# Patient Record
Sex: MALE | Race: WHITE | NOT HISPANIC OR LATINO | Employment: FULL TIME | URBAN - METROPOLITAN AREA
[De-identification: names, ages, dates, MRNs, and addresses within clinical notes are randomized per-mention and may not be internally consistent; named-entity substitution may affect disease eponyms.]

---

## 2017-06-02 ENCOUNTER — OFFICE VISIT (OUTPATIENT)
Dept: MEDICAL GROUP | Facility: PHYSICIAN GROUP | Age: 37
End: 2017-06-02
Payer: COMMERCIAL

## 2017-06-02 VITALS
OXYGEN SATURATION: 96 % | TEMPERATURE: 97.2 F | BODY MASS INDEX: 37.19 KG/M2 | SYSTOLIC BLOOD PRESSURE: 128 MMHG | RESPIRATION RATE: 18 BRPM | HEIGHT: 77 IN | DIASTOLIC BLOOD PRESSURE: 62 MMHG | HEART RATE: 86 BPM | WEIGHT: 315 LBS

## 2017-06-02 DIAGNOSIS — Z72.0 CURRENT TOBACCO USE: ICD-10-CM

## 2017-06-02 DIAGNOSIS — Z13.6 SCREENING FOR CARDIOVASCULAR, RESPIRATORY, AND GENITOURINARY DISEASES: ICD-10-CM

## 2017-06-02 DIAGNOSIS — Z11.3 SCREENING FOR STDS (SEXUALLY TRANSMITTED DISEASES): ICD-10-CM

## 2017-06-02 DIAGNOSIS — Z13.89 SCREENING FOR CARDIOVASCULAR, RESPIRATORY, AND GENITOURINARY DISEASES: ICD-10-CM

## 2017-06-02 DIAGNOSIS — Z13.83 SCREENING FOR CARDIOVASCULAR, RESPIRATORY, AND GENITOURINARY DISEASES: ICD-10-CM

## 2017-06-02 DIAGNOSIS — F19.10 SUBSTANCE ABUSE (HCC): ICD-10-CM

## 2017-06-02 DIAGNOSIS — E66.01 MORBID OBESITY WITH BMI OF 40.0-44.9, ADULT (HCC): ICD-10-CM

## 2017-06-02 PROCEDURE — 99204 OFFICE O/P NEW MOD 45 MIN: CPT | Performed by: NURSE PRACTITIONER

## 2017-06-02 RX ORDER — TRAZODONE HYDROCHLORIDE 150 MG/1
150 TABLET ORAL NIGHTLY
COMMUNITY
End: 2017-06-02

## 2017-06-02 ASSESSMENT — PATIENT HEALTH QUESTIONNAIRE - PHQ9: CLINICAL INTERPRETATION OF PHQ2 SCORE: 0

## 2017-06-02 NOTE — PROGRESS NOTES
Chief Complaint   Patient presents with   • Establish Care   • Other     weight gain    • Nicotine Dependence   • Orders Needed     STD labs        HPI:    Phil Rossi is a 37 y.o. male here to establish care and to discuss the following:  Patient has not had a previous primary care    Substance abuse  Patient admits to being a recovering alcohol and drug addict with his last intake on October 18, 2017. He's been using all kinds of drugs to include cocaine, meth and heroin.  He also admits to using injectable testosterone for muscle growth and his last dose was 1 mL May 25, 2017. Patient is requesting STD testing due to lifestyle.    Morbid obesity with BMI of 40.0-44.9, adult (HCC)  Patient is morbidly obese with a BMI of 41.52. Currently 3 and 50 pounds and states that he continues to use something about his weight. He states that he is a recovering addict and has been using drugs and alcohol until October 18, 2016. He would like a referral to discuss weight management options.    Current tobacco use  Patient reports trying to quit smoking and has decreased to 8 cigarettes daily along with vaping        Current medicines (including changes today)  No current outpatient prescriptions on file.     No current facility-administered medications for this visit.     He  has a past medical history of PTSD (post-traumatic stress disorder).  He  has no past surgical history on file.  Social History   Substance Use Topics   • Smoking status: Current Some Day Smoker -- 0.50 packs/day   • Smokeless tobacco: Current User      Comment: 8 cigarettes and vaping   • Alcohol Use: No      Comment: alcoholism October 18, 2014 recovering addict     Social History     Social History Narrative     History reviewed. No pertinent family history.  No family status information on file.         ROS  Review of Systems   Constitutional: Negative for fever, chills, weight loss and malaise/fatigue.   HENT: Negative for ear pain, nosebleeds,  "congestion, sore throat and neck pain.    Eyes: Negative for blurred vision.   Respiratory: Negative for cough, sputum production, shortness of breath and wheezing.    Cardiovascular: Negative for chest pain, palpitations,  and leg swelling.   Gastrointestinal: Negative for heartburn, nausea, vomiting, diarrhea and abdominal pain.   Genitourinary: Negative for dysuria, urgency and frequency.   Musculoskeletal: Negative for myalgias, back pain and joint pain.   Skin: Negative for rash and itching  Neurological: Negative for dizziness, tingling, tremors, sensory change, focal weakness and headaches.   Endo/Heme/Allergies: Does not bruise/bleed easily.   Psychiatric/Behavioral: Negative for depression, anxiety, suicidal ideas, insomnia and memory loss.      All other systems reviewed and are negative except as in HPI.     Objective:     Blood pressure 128/62, pulse 86, temperature 36.2 °C (97.2 °F), resp. rate 18, height 1.956 m (6' 5.01\"), weight 158.85 kg (350 lb 3.2 oz), SpO2 96 %. Body mass index is 41.52 kg/(m^2).  Physical Exam:  Constitutional: Alert, no distress.  Skin: Warm, dry, good turgor, no rashes in visible areas. No abscesses noted   Eye: Equal, round and reactive, conjunctiva clear, lids normal.  ENMT: Lips without lesions, good dentition, oropharynx clear. Ear canals are clear, TMs within normal limits bilaterally.   Neck: Trachea midline, no masses, no thyromegaly. No cervical or supraclavicular lymphadenopathy.  Respiratory: Unlabored respiratory effort, lungs clear to auscultation, no wheezes, no ronchi.  Cardiovascular: Normal S1, S2, no murmur, no edema.  Abdomen: Obese, Soft, non-tender, no masses, no hepatosplenomegaly.  Psych: Alert and oriented x3, normal affect and mood.  MS: Ambulates independently with steady gait. Has full range of motion of all extremities and spine.  Neuro:  Cranial nerves intact. DTRs within normal limits. No neurological deficits.        Assessment and Plan:   The " following treatment plan was discussed   1. Morbid obesity with BMI of 40.0-44.9, adult ([facility])  REFERRAL TO BARIATRIC SURGERY    TSH WITH REFLEX TO FT4    Patient identified as having weight management issue.  Appropriate orders and counseling given.    Referrals to bariatrics   2. Substance abuse  VITAMIN D,25 HYDROXY    VITAMIN B12    TESTOSTERONE SERUM    Discussed substance abuse cessation   3. Screening for cardiovascular, respiratory, and genitourinary diseases  COMP METABOLIC PANEL    LIPID PROFILE    Labs ordered   4. Screening for STDs (sexually transmitted diseases)  HEPATITIS B SURFACE ANTIGEN    T.PALLIDUM AB EIA    HEP C VIRUS ANTIBODY    HIV ANTIBODIES    CHLAMYDIA/GC PCR URINE OR SWAB    Labs ordered   5. Current tobacco use      Discussed smoking cessation       Followup: Return in about 4 weeks (around 6/30/2017) for Lab Review.   Please note that this dictation was created using voice recognition software. I have made every reasonable attempt to correct obvious errors, but I expect that there are errors of grammar and possibly content that I did not discover before finalizing the note.

## 2017-06-02 NOTE — ASSESSMENT & PLAN NOTE
Patient admits to being a recovering alcohol and drug addict with his last intake on October 18, 2017. He's been using all kinds of drugs to include cocaine, meth and heroin.  He also admits to using injectable testosterone for muscle growth and his last dose was 1 mL May 25, 2017. Patient is requesting STD testing due to lifestyle.

## 2017-06-06 PROBLEM — Z72.0 CURRENT TOBACCO USE: Status: ACTIVE | Noted: 2017-06-06

## 2017-06-07 NOTE — ASSESSMENT & PLAN NOTE
Patient is morbidly obese with a BMI of 41.52. Currently 3 and 50 pounds and states that he continues to use something about his weight. He states that he is a recovering addict and has been using drugs and alcohol until October 18, 2016. He would like a referral to discuss weight management options.

## 2017-06-17 ENCOUNTER — HOSPITAL ENCOUNTER (OUTPATIENT)
Dept: LAB | Facility: MEDICAL CENTER | Age: 37
End: 2017-06-17
Attending: NURSE PRACTITIONER
Payer: COMMERCIAL

## 2017-06-17 DIAGNOSIS — Z13.83 SCREENING FOR CARDIOVASCULAR, RESPIRATORY, AND GENITOURINARY DISEASES: ICD-10-CM

## 2017-06-17 DIAGNOSIS — Z11.3 SCREENING FOR STDS (SEXUALLY TRANSMITTED DISEASES): ICD-10-CM

## 2017-06-17 DIAGNOSIS — E66.01 MORBID OBESITY WITH BMI OF 40.0-44.9, ADULT (HCC): ICD-10-CM

## 2017-06-17 DIAGNOSIS — Z13.89 SCREENING FOR CARDIOVASCULAR, RESPIRATORY, AND GENITOURINARY DISEASES: ICD-10-CM

## 2017-06-17 DIAGNOSIS — F19.10 SUBSTANCE ABUSE (HCC): ICD-10-CM

## 2017-06-17 DIAGNOSIS — Z13.6 SCREENING FOR CARDIOVASCULAR, RESPIRATORY, AND GENITOURINARY DISEASES: ICD-10-CM

## 2017-06-17 LAB
25(OH)D3 SERPL-MCNC: 29 NG/ML (ref 30–100)
ALBUMIN SERPL BCP-MCNC: 4.4 G/DL (ref 3.2–4.9)
ALBUMIN/GLOB SERPL: 1.1 G/DL
ALP SERPL-CCNC: 91 U/L (ref 30–99)
ALT SERPL-CCNC: 165 U/L (ref 2–50)
ANION GAP SERPL CALC-SCNC: 8 MMOL/L (ref 0–11.9)
AST SERPL-CCNC: 78 U/L (ref 12–45)
BILIRUB SERPL-MCNC: 0.9 MG/DL (ref 0.1–1.5)
BUN SERPL-MCNC: 18 MG/DL (ref 8–22)
CALCIUM SERPL-MCNC: 9.8 MG/DL (ref 8.5–10.5)
CHLORIDE SERPL-SCNC: 104 MMOL/L (ref 96–112)
CHOLEST SERPL-MCNC: 161 MG/DL (ref 100–199)
CO2 SERPL-SCNC: 24 MMOL/L (ref 20–33)
CREAT SERPL-MCNC: 0.91 MG/DL (ref 0.5–1.4)
GFR SERPL CREATININE-BSD FRML MDRD: >60 ML/MIN/1.73 M 2
GLOBULIN SER CALC-MCNC: 4 G/DL (ref 1.9–3.5)
GLUCOSE SERPL-MCNC: 107 MG/DL (ref 65–99)
HBV SURFACE AG SER QL: NEGATIVE
HCV AB SER QL: REACTIVE
HDLC SERPL-MCNC: 30 MG/DL
HIV 1+2 AB+HIV1 P24 AG SERPL QL IA: NON REACTIVE
LDLC SERPL CALC-MCNC: 99 MG/DL
POTASSIUM SERPL-SCNC: 4.1 MMOL/L (ref 3.6–5.5)
PROT SERPL-MCNC: 8.4 G/DL (ref 6–8.2)
SODIUM SERPL-SCNC: 136 MMOL/L (ref 135–145)
TESTOST SERPL-MCNC: 131 NG/DL (ref 175–781)
TREPONEMA PALLIDUM IGG+IGM AB [PRESENCE] IN SERUM OR PLASMA BY IMMUNOASSAY: NON REACTIVE
TRIGL SERPL-MCNC: 161 MG/DL (ref 0–149)
TSH SERPL DL<=0.005 MIU/L-ACNC: 1.58 UIU/ML (ref 0.3–3.7)
VIT B12 SERPL-MCNC: 620 PG/ML (ref 211–911)

## 2017-06-17 PROCEDURE — 80053 COMPREHEN METABOLIC PANEL: CPT

## 2017-06-17 PROCEDURE — 82306 VITAMIN D 25 HYDROXY: CPT

## 2017-06-17 PROCEDURE — 87522 HEPATITIS C REVRS TRNSCRPJ: CPT

## 2017-06-17 PROCEDURE — 36415 COLL VENOUS BLD VENIPUNCTURE: CPT

## 2017-06-17 PROCEDURE — 80061 LIPID PANEL: CPT

## 2017-06-17 PROCEDURE — 87389 HIV-1 AG W/HIV-1&-2 AB AG IA: CPT

## 2017-06-17 PROCEDURE — 86803 HEPATITIS C AB TEST: CPT

## 2017-06-17 PROCEDURE — 87340 HEPATITIS B SURFACE AG IA: CPT

## 2017-06-17 PROCEDURE — 87491 CHLMYD TRACH DNA AMP PROBE: CPT

## 2017-06-17 PROCEDURE — 87591 N.GONORRHOEAE DNA AMP PROB: CPT

## 2017-06-17 PROCEDURE — 82607 VITAMIN B-12: CPT

## 2017-06-17 PROCEDURE — 84403 ASSAY OF TOTAL TESTOSTERONE: CPT

## 2017-06-17 PROCEDURE — 84443 ASSAY THYROID STIM HORMONE: CPT

## 2017-06-17 PROCEDURE — 86780 TREPONEMA PALLIDUM: CPT

## 2017-06-19 LAB
C TRACH DNA SPEC QL NAA+PROBE: NEGATIVE
N GONORRHOEA DNA SPEC QL NAA+PROBE: NEGATIVE
SPECIMEN SOURCE: NORMAL

## 2017-06-20 LAB
HCV RNA SERPL NAA+PROBE-ACNC: ABNORMAL IU/ML
HCV RNA SERPL NAA+PROBE-LOG IU: 6.4 LOG IU
HCV RNA SERPL QL NAA+PROBE: DETECTED
PATHOLOGY STUDY: ABNORMAL

## 2017-06-22 ENCOUNTER — TELEPHONE (OUTPATIENT)
Dept: MEDICAL GROUP | Facility: PHYSICIAN GROUP | Age: 37
End: 2017-06-22

## 2017-06-22 NOTE — TELEPHONE ENCOUNTER
----- Message from ORACIO Adams sent at 6/22/2017 10:13 AM PDT -----  Please have patient schedule an appointment to go over results.  Thank you,  ORACIO Adams

## 2017-06-27 ENCOUNTER — OFFICE VISIT (OUTPATIENT)
Dept: MEDICAL GROUP | Facility: PHYSICIAN GROUP | Age: 37
End: 2017-06-27
Payer: COMMERCIAL

## 2017-06-27 VITALS
TEMPERATURE: 98.4 F | HEART RATE: 74 BPM | DIASTOLIC BLOOD PRESSURE: 86 MMHG | OXYGEN SATURATION: 96 % | HEIGHT: 77 IN | RESPIRATION RATE: 18 BRPM | WEIGHT: 315 LBS | BODY MASS INDEX: 37.19 KG/M2 | SYSTOLIC BLOOD PRESSURE: 124 MMHG

## 2017-06-27 DIAGNOSIS — R74.8 ELEVATED LIVER ENZYMES: ICD-10-CM

## 2017-06-27 DIAGNOSIS — E66.01 MORBID OBESITY WITH BMI OF 40.0-44.9, ADULT (HCC): ICD-10-CM

## 2017-06-27 PROCEDURE — 99214 OFFICE O/P EST MOD 30 MIN: CPT | Performed by: NURSE PRACTITIONER

## 2017-06-27 NOTE — MR AVS SNAPSHOT
"        Phil Rossi   2017 11:40 AM   Office Visit   MRN: [id number]    Department:  [facility]   Dept Phone:  [phone]    Description:  Male : 1980   Provider:  ORACIO Adams           Reason for Visit     Follow-Up Labs       Allergies as of 2017     No Known Allergies      You were diagnosed with     Hepatitis   [897236]       Elevated liver enzymes   [[id number]]         Vital Signs     Blood Pressure Pulse Temperature Respirations Height Weight    124/86 mmHg 74 36.9 °C (98.4 °F) 18 1.956 m (6' 5.01\") 157.942 kg (348 lb 3.2 oz)    Body Mass Index Oxygen Saturation Smoking Status             41.28 kg/m2 96% Current Some Day Smoker         Basic Information     Date Of Birth Sex Race Ethnicity Preferred Language    1980 Male White Non- English      Problem List              ICD-10-CM Priority Class Noted - Resolved    Morbid obesity with BMI of 40.0-44.9, adult (HCC) E66.01, Z68.41   2017 - Present    Substance abuse F19.10   2017 - Present    Current tobacco use Z72.0   2017 - Present    Elevated liver enzymes R74.8   2017 - Present      Health Maintenance        Date Due Completion Dates    IMM PNEUMOCOCCAL 19-64 (ADULT) MEDIUM RISK SERIES (1 of 1 - PPSV23) 3/7/1997 ---    IMM DTaP/Tdap/Td Vaccine (2 - Td) 2023            Current Immunizations     Tdap Vaccine 2013 11:50 AM      Below and/or attached are the medications your provider expects you to take. Review all of your home medications and newly ordered medications with your provider and/or pharmacist. Follow medication instructions as directed by your provider and/or pharmacist. Please keep your medication list with you and share with your provider. Update the information when medications are discontinued, doses are changed, or new medications (including over-the-counter products) are added; and carry medication information at all times in the event of emergency " situations     Allergies:  No Known Allergies          Medications  Valid as of: June 27, 2017 -  2:04 PM    Generic Name Brand Name Tablet Size Instructions for use    .                 Medicines prescribed today were sent to:     [facility] [address] [place] [address] - [address] [facility]    [address] [facility] [place] [address]    Phone: [phone] Fax: [phone]    Open 24 Hours?: No      Medication refill instructions:       If your prescription bottle indicates you have medication refills left, it is not necessary to call your provider’s office. Please contact your pharmacy and they will refill your medication.    If your prescription bottle indicates you do not have any refills left, you may request refills at any time through one of the following ways: The online [employer] system (except Urgent Care), by calling your provider’s office, or by asking your pharmacy to contact your provider’s office with a refill request. Medication refills are processed only during regular business hours and may not be available until the next business day. Your provider may request additional information or to have a follow-up visit with you prior to refilling your medication.   *Please Note: Medication refills are assigned a new Rx number when refilled electronically. Your pharmacy may indicate that no refills were authorized even though a new prescription for the same medication is available at the pharmacy. Please request the medicine by name with the pharmacy before contacting your provider for a refill.        Referral     A referral request has been sent to our patient care coordination department. Please allow 3-5 business days for us to process this request and contact you either by phone or mail. If you do not hear from us by the 5th business day, please call us at [phone].           [employer] Access Code: [email or web address]  Expires: 7/1/2017  2:48 PM    REPUBLIC [employer]OURCES  A secure, online tool to manage your health  information     [employer]’s Futur[employer].tk® is a secure, online tool that connects you to your personalized health information from the privacy of your home -- day or night - making it very easy for you to manage your healthcare. Once the activation process is completed, you can even access your medical information using the Futuris.tk sue, which is available for free in the Apple Sue store or Google Play store.     Futuris.tk provides the following levels of access (as shown below):   My Chart Features   [facility] Doctor [facility]  Specialists [facility]  Urgent  Care Non-[facility]  Primary Care  Doctor   Email your healthcare team securely and privately 24/7 X X X    Manage appointments: schedule your next appointment; view details of past/upcoming appointments X      Request prescription refills. X      View recent personal medical records, including lab and immunizations X X X X   View health record, including health history, allergies, medications X X X X   Read reports about your outpatient visits, procedures, consult and ER notes X X X X   See your discharge summary, which is a recap of your hospital and/or ER visit that includes your diagnosis, lab results, and care plan. X X       How to register for Futuris.tk:  1. Go to  [email or web address].  2. Click on the Sign Up Now box, which takes you to the New Member Sign Up page. You will need to provide the following information:  a. Enter your Futuris.tk Access Code exactly as it appears at the top of this page. (You will not need to use this code after you’ve completed the sign-up process. If you do not sign up before the expiration date, you must request a new code.)   b. Enter your date of birth.   c. Enter your home email address.   d. Click Submit, and follow the next screen’s instructions.  3. Create a Futuris.tk ID. This will be your Futuris.tk login ID and cannot be changed, so think of one that is secure and easy to remember.  4. Create a Futuris.tk password. You can  change your password at any time.  5. Enter your Password Reset Question and Answer. This can be used at a later time if you forget your password.   6. Enter your e-mail address. This allows you to receive e-mail notifications when new information is available in [employer].  7. Click Sign Up. You can now view your health information.    For assistance activating your [employer] account, call [phone]        Quit Tobacco Information     Do you want to quit using tobacco?    Quitting tobacco decreases risks of cancer, heart and lung disease, increases life expectancy, improves sense of taste and smell, and increases spending money, among other benefits.    If you are thinking about quitting, we can help.  • [facility] Quit Tobacco Program: 769.961.2059  o Program occurs weekly for four weeks and includes pharmacist consultation on products to support quitting smoking or chewing tobacco. A provider referral is needed for pharmacist consultation.  • Tobacco Users Help Hotline: 6-800-QUIT-NOW (011-0051) or https://[place].quitlogix.org/  o Free, confidential telephone and online coaching for [place] residents. Sessions are designed on a schedule that is convenient for you. Eligible clients receive free nicotine replacement therapy.  • Nationally: www.smokefree.gov  o Information and professional assistance to support both immediate and long-term needs as you become, and remain, a non-smoker. Smokefree.gov allows you to choose the help that best fits your needs.

## 2017-06-27 NOTE — ASSESSMENT & PLAN NOTE
Patient has elevated liver enzymes with inconclusive hepatitis testing, due to its inadequate specimen, that appears to be abnormally positive. Patient has a history of IV drug use. Hepatitis testing will not be repeated at this time. He will be provided a referral to gastroenterology for further evaluation and testing

## 2017-06-28 NOTE — PROGRESS NOTES
"Subjective:     Chief Complaint   Patient presents with   • Follow-Up     Labs        HPI:  Phil Rossi is a 37 y.o. male here today to discuss the following:    Elevated liver enzymes  Patient has elevated liver enzymes with inconclusive hepatitis testing, due to its inadequate specimen, that appears to be abnormally positive. Patient has a history of IV drug use. Hepatitis testing will not be repeated at this time. He will be provided a referral to gastroenterology for further evaluation and testing    Morbid obesity with BMI of 40.0-44.9, adult (HCC)  Patient is morbidly obese with a BMI of 41.28. He received a referral at previous visit to discuss weight management options, but has not made an appointment at this time.           Current medicines (including changes today)  No current outpatient prescriptions on file.     No current facility-administered medications for this visit.       He  has a past medical history of PTSD (post-traumatic stress disorder).    ROS   Review of Systems   Constitutional: Negative for fever, chills, weight loss and malaise/fatigue.   HENT: Negative for ear pain, nosebleeds, congestion, sore throat and neck pain.    Respiratory: Negative for cough, sputum production, shortness of breath and wheezing.    Cardiovascular: Negative for chest pain, palpitations,  and leg swelling.   Gastrointestinal: Negative for heartburn, nausea, vomiting, diarrhea and abdominal pain.   Neurological: Negative for dizziness, tingling, tremors, sensory change, focal weakness and headaches.   Psychiatric/Behavioral: Negative for depression, anxiety, suicidal ideas, insomnia and memory loss.    All other systems reviewed and are negative except as in HPI.     Objective:   Physical Exam:  Blood pressure 124/86, pulse 74, temperature 36.9 °C (98.4 °F), resp. rate 18, height 1.956 m (6' 5.01\"), weight 157.942 kg (348 lb 3.2 oz), SpO2 96 %. Body mass index is 41.28 kg/(m^2).   General:  Well nourished, well " developed in NAD  Head is grossly normal.  Neck: Supple without JVD   Pulmonary:  Normal effort.  Cardiovascular: Regular rate   Extremities: no clubbing, cyanosis, or edema.   Assessment and Plan:   The following treatment plan was discussed   1. Elevated liver enzymes  REFERRAL TO GASTROENTEROLOGY    Referral to gastroenterology   2. Morbid obesity with BMI of 40.0-44.9, adult (HCC)     I have reviewed all recent labs with the patient and answered all questions.    Followup: Return if symptoms worsen or fail to improve, for following GI referral.   Please note that this dictation was created using voice recognition software. I have made every reasonable attempt to correct obvious errors, but I expect that there are errors of grammar and possibly content that I did not discover before finalizing the note.

## 2017-06-28 NOTE — ASSESSMENT & PLAN NOTE
Patient is morbidly obese with a BMI of 41.28. He received a referral at previous visit to discuss weight management options, but has not made an appointment at this time.

## 2017-08-07 ENCOUNTER — HOSPITAL ENCOUNTER (OUTPATIENT)
Dept: LAB | Facility: MEDICAL CENTER | Age: 37
End: 2017-08-07
Attending: INTERNAL MEDICINE
Payer: COMMERCIAL

## 2017-08-07 ENCOUNTER — HOSPITAL ENCOUNTER (OUTPATIENT)
Dept: RADIOLOGY | Facility: MEDICAL CENTER | Age: 37
End: 2017-08-07
Attending: INTERNAL MEDICINE
Payer: COMMERCIAL

## 2017-08-07 DIAGNOSIS — R11.2 INTRACTABLE VOMITING WITH NAUSEA, UNSPECIFIED VOMITING TYPE: ICD-10-CM

## 2017-08-07 LAB — GFR SERPL CREATININE-BSD FRML MDRD: >60 ML/MIN/1.73 M 2

## 2017-08-07 PROCEDURE — 82784 ASSAY IGA/IGD/IGG/IGM EACH: CPT

## 2017-08-07 PROCEDURE — 86255 FLUORESCENT ANTIBODY SCREEN: CPT

## 2017-08-07 PROCEDURE — 87522 HEPATITIS C REVRS TRNSCRPJ: CPT | Mod: 91

## 2017-08-07 PROCEDURE — 83883 ASSAY NEPHELOMETRY NOT SPEC: CPT

## 2017-08-07 PROCEDURE — 82784 ASSAY IGA/IGD/IGG/IGM EACH: CPT | Mod: 91

## 2017-08-07 PROCEDURE — 84460 ALANINE AMINO (ALT) (SGPT): CPT

## 2017-08-07 PROCEDURE — 86708 HEPATITIS A ANTIBODY: CPT

## 2017-08-07 PROCEDURE — 74245 DX-UPPER GI-SMALL BOWEL FOLLOW THRU: CPT

## 2017-08-07 PROCEDURE — 83540 ASSAY OF IRON: CPT

## 2017-08-07 PROCEDURE — 86803 HEPATITIS C AB TEST: CPT

## 2017-08-07 PROCEDURE — 700112 HCHG RX REV CODE 229: Performed by: INTERNAL MEDICINE

## 2017-08-07 PROCEDURE — 86704 HEP B CORE ANTIBODY TOTAL: CPT

## 2017-08-07 PROCEDURE — 84450 TRANSFERASE (AST) (SGOT): CPT

## 2017-08-07 PROCEDURE — 82977 ASSAY OF GGT: CPT

## 2017-08-07 PROCEDURE — 76700 US EXAM ABDOM COMPLETE: CPT

## 2017-08-07 PROCEDURE — 87340 HEPATITIS B SURFACE AG IA: CPT

## 2017-08-07 PROCEDURE — 36415 COLL VENOUS BLD VENIPUNCTURE: CPT

## 2017-08-07 PROCEDURE — 82728 ASSAY OF FERRITIN: CPT

## 2017-08-07 PROCEDURE — 84520 ASSAY OF UREA NITROGEN: CPT

## 2017-08-07 PROCEDURE — 87902 NFCT AGT GNTYP ALYS HEP C: CPT

## 2017-08-07 PROCEDURE — 85049 AUTOMATED PLATELET COUNT: CPT

## 2017-08-07 PROCEDURE — 86706 HEP B SURFACE ANTIBODY: CPT

## 2017-08-07 PROCEDURE — 80053 COMPREHEN METABOLIC PANEL: CPT

## 2017-08-07 PROCEDURE — 83516 IMMUNOASSAY NONANTIBODY: CPT

## 2017-08-07 PROCEDURE — A9270 NON-COVERED ITEM OR SERVICE: HCPCS | Performed by: INTERNAL MEDICINE

## 2017-08-07 RX ADMIN — ANTACID/ANTIFLATULENT 1 PACKET: 380; 550; 10; 10 GRANULE, EFFERVESCENT ORAL at 08:50

## 2017-08-08 LAB
ALBUMIN SERPL BCP-MCNC: 3.9 G/DL (ref 3.2–4.9)
ALBUMIN/GLOB SERPL: 1.1 G/DL
ALP SERPL-CCNC: 85 U/L (ref 30–99)
ALT SERPL-CCNC: 115 U/L (ref 2–50)
ANION GAP SERPL CALC-SCNC: 6 MMOL/L (ref 0–11.9)
AST SERPL-CCNC: 56 U/L (ref 12–45)
BILIRUB SERPL-MCNC: 0.5 MG/DL (ref 0.1–1.5)
BUN SERPL-MCNC: 16 MG/DL (ref 8–22)
CALCIUM SERPL-MCNC: 9.2 MG/DL (ref 8.5–10.5)
CHLORIDE SERPL-SCNC: 109 MMOL/L (ref 96–112)
CO2 SERPL-SCNC: 22 MMOL/L (ref 20–33)
CREAT SERPL-MCNC: 0.79 MG/DL (ref 0.5–1.4)
FERRITIN SERPL-MCNC: 142.5 NG/ML (ref 22–322)
GLOBULIN SER CALC-MCNC: 3.7 G/DL (ref 1.9–3.5)
GLUCOSE SERPL-MCNC: 93 MG/DL (ref 65–99)
HAV AB SER QL IA: NEGATIVE
HBV CORE AB SERPL QL IA: NEGATIVE
HBV SURFACE AB SERPL IA-ACNC: <3.1 MIU/ML (ref 0–10)
HBV SURFACE AG SER QL: NEGATIVE
HCV AB SER QL: REACTIVE
IGA SERPL-MCNC: 271 MG/DL (ref 68–408)
IGG SERPL-MCNC: 1800 MG/DL (ref 768–1632)
IRON SERPL-MCNC: 70 UG/DL (ref 50–180)
MITOCHONDRIA M2 IGG SER-ACNC: 8.4 UNITS (ref 0–20)
PLATELET # BLD AUTO: 276 K/UL (ref 164–446)
POTASSIUM SERPL-SCNC: 4 MMOL/L (ref 3.6–5.5)
PROT SERPL-MCNC: 7.6 G/DL (ref 6–8.2)
SODIUM SERPL-SCNC: 137 MMOL/L (ref 135–145)

## 2017-08-09 LAB
A2 MACROGLOB SERPL-MCNC: 339 MG/DL (ref 131–293)
ALT SERPL-CCNC: 150 U/L (ref 5–50)
ANNOTATION COMMENT IMP: ABNORMAL
AST SERPL-CCNC: 70 U/L (ref 9–50)
BUN SERPL-SCNC: 16 MG/DL (ref 7–20)
CIRRHOMETER PT SCORE Q4850: 0
FIBROSIS STAGE SERPL QL: ABNORMAL
GGT SERPL-CCNC: 58 U/L (ref 7–51)
GLIADIN PEPTIDE+TTG IGA+IGG SER QL IA: 14 UNITS (ref 0–19)
HCV RNA SERPL NAA+PROBE-ACNC: ABNORMAL IU/ML
HCV RNA SERPL NAA+PROBE-LOG IU: 6.5 LOG IU
HCV RNA SERPL QL NAA+PROBE: DETECTED
INFLAMETER META CLASS Q4853: ABNORMAL
INFLAMETER PT SCORE Q4852: 0.59
LIVER FIBR SCORE SERPL CALC.FIBROMETER: 0.37
PATHOLOGY STUDY: ABNORMAL
PATHOLOGY STUDY: ABNORMAL
PATHOLOGY STUDY: NORMAL
PROTHROM ACT/NOR PPP: 115 % (ref 90–120)
SMA IGG SER-ACNC: 16 UNITS (ref 0–19)

## 2017-08-10 LAB
HCV RNA SERPL NAA+PROBE-ACNC: NORMAL IU/ML
HCV RNA SERPL NAA+PROBE-LOG IU: 6.85 LOG10 IU/ML
TEST INFO  93644: NORMAL

## 2017-08-11 LAB — HCV GENTYP SERPL NAA+PROBE: NORMAL

## 2017-10-11 ENCOUNTER — HOSPITAL ENCOUNTER (OUTPATIENT)
Dept: LAB | Facility: MEDICAL CENTER | Age: 37
End: 2017-10-11
Attending: INTERNAL MEDICINE
Payer: COMMERCIAL

## 2017-10-11 LAB
ALBUMIN SERPL BCP-MCNC: 4.5 G/DL (ref 3.2–4.9)
ALBUMIN/GLOB SERPL: 1.2 G/DL
ALP SERPL-CCNC: 98 U/L (ref 30–99)
ALT SERPL-CCNC: 23 U/L (ref 2–50)
ANION GAP SERPL CALC-SCNC: 6 MMOL/L (ref 0–11.9)
AST SERPL-CCNC: 22 U/L (ref 12–45)
BASOPHILS # BLD AUTO: 0.6 % (ref 0–1.8)
BASOPHILS # BLD: 0.07 K/UL (ref 0–0.12)
BILIRUB SERPL-MCNC: 0.6 MG/DL (ref 0.1–1.5)
BUN SERPL-MCNC: 13 MG/DL (ref 8–22)
CALCIUM SERPL-MCNC: 10 MG/DL (ref 8.5–10.5)
CHLORIDE SERPL-SCNC: 103 MMOL/L (ref 96–112)
CO2 SERPL-SCNC: 29 MMOL/L (ref 20–33)
CREAT SERPL-MCNC: 0.93 MG/DL (ref 0.5–1.4)
EOSINOPHIL # BLD AUTO: 0.44 K/UL (ref 0–0.51)
EOSINOPHIL NFR BLD: 4.1 % (ref 0–6.9)
ERYTHROCYTE [DISTWIDTH] IN BLOOD BY AUTOMATED COUNT: 37.3 FL (ref 35.9–50)
GFR SERPL CREATININE-BSD FRML MDRD: >60 ML/MIN/1.73 M 2
GLOBULIN SER CALC-MCNC: 3.9 G/DL (ref 1.9–3.5)
GLUCOSE SERPL-MCNC: 82 MG/DL (ref 65–99)
HCT VFR BLD AUTO: 50.4 % (ref 42–52)
HGB BLD-MCNC: 17.3 G/DL (ref 14–18)
IMM GRANULOCYTES # BLD AUTO: 0.05 K/UL (ref 0–0.11)
IMM GRANULOCYTES NFR BLD AUTO: 0.5 % (ref 0–0.9)
LYMPHOCYTES # BLD AUTO: 3.35 K/UL (ref 1–4.8)
LYMPHOCYTES NFR BLD: 31 % (ref 22–41)
MCH RBC QN AUTO: 29.5 PG (ref 27–33)
MCHC RBC AUTO-ENTMCNC: 34.3 G/DL (ref 33.7–35.3)
MCV RBC AUTO: 85.9 FL (ref 81.4–97.8)
MONOCYTES # BLD AUTO: 1.13 K/UL (ref 0–0.85)
MONOCYTES NFR BLD AUTO: 10.5 % (ref 0–13.4)
NEUTROPHILS # BLD AUTO: 5.76 K/UL (ref 1.82–7.42)
NEUTROPHILS NFR BLD: 53.3 % (ref 44–72)
NRBC # BLD AUTO: 0 K/UL
NRBC BLD AUTO-RTO: 0 /100 WBC
PLATELET # BLD AUTO: 301 K/UL (ref 164–446)
PMV BLD AUTO: 9.1 FL (ref 9–12.9)
POTASSIUM SERPL-SCNC: 4.5 MMOL/L (ref 3.6–5.5)
PROT SERPL-MCNC: 8.4 G/DL (ref 6–8.2)
RBC # BLD AUTO: 5.87 M/UL (ref 4.7–6.1)
SODIUM SERPL-SCNC: 138 MMOL/L (ref 135–145)
WBC # BLD AUTO: 10.8 K/UL (ref 4.8–10.8)

## 2017-10-11 PROCEDURE — 85025 COMPLETE CBC W/AUTO DIFF WBC: CPT

## 2017-10-11 PROCEDURE — 80053 COMPREHEN METABOLIC PANEL: CPT

## 2017-10-11 PROCEDURE — 36415 COLL VENOUS BLD VENIPUNCTURE: CPT

## 2017-10-11 PROCEDURE — 87522 HEPATITIS C REVRS TRNSCRPJ: CPT

## 2017-10-13 LAB
HCV RNA SERPL NAA+PROBE-ACNC: NORMAL IU/ML
TEST INFO  93644: NORMAL

## 2017-11-18 ENCOUNTER — HOSPITAL ENCOUNTER (OUTPATIENT)
Dept: LAB | Facility: MEDICAL CENTER | Age: 37
End: 2017-11-18
Attending: NURSE PRACTITIONER
Payer: COMMERCIAL

## 2017-11-18 LAB — H PYLORI AG STL QL IA: NOT DETECTED

## 2017-11-18 PROCEDURE — 87338 HPYLORI STOOL AG IA: CPT

## 2017-11-21 ENCOUNTER — TELEPHONE (OUTPATIENT)
Dept: MEDICAL GROUP | Facility: PHYSICIAN GROUP | Age: 37
End: 2017-11-21

## 2017-11-21 NOTE — TELEPHONE ENCOUNTER
----- Message from ORACIO Adams sent at 11/21/2017  9:30 AM PST -----  Please call patient. I have reviewed their most recent stool lab results. They are within normal limits.  Followup as discussed.    Please message me with any questions or concerns.

## 2017-12-07 ENCOUNTER — HOSPITAL ENCOUNTER (OUTPATIENT)
Dept: LAB | Facility: MEDICAL CENTER | Age: 37
End: 2017-12-07
Attending: INTERNAL MEDICINE
Payer: COMMERCIAL

## 2017-12-07 LAB
ALBUMIN SERPL BCP-MCNC: 4.1 G/DL (ref 3.2–4.9)
ALBUMIN/GLOB SERPL: 1.2 G/DL
ALP SERPL-CCNC: 88 U/L (ref 30–99)
ALT SERPL-CCNC: 26 U/L (ref 2–50)
ANION GAP SERPL CALC-SCNC: 7 MMOL/L (ref 0–11.9)
AST SERPL-CCNC: 23 U/L (ref 12–45)
BASOPHILS # BLD AUTO: 0.9 % (ref 0–1.8)
BASOPHILS # BLD: 0.1 K/UL (ref 0–0.12)
BILIRUB SERPL-MCNC: 0.4 MG/DL (ref 0.1–1.5)
BUN SERPL-MCNC: 15 MG/DL (ref 8–22)
CALCIUM SERPL-MCNC: 10.1 MG/DL (ref 8.5–10.5)
CHLORIDE SERPL-SCNC: 105 MMOL/L (ref 96–112)
CO2 SERPL-SCNC: 25 MMOL/L (ref 20–33)
CREAT SERPL-MCNC: 0.83 MG/DL (ref 0.5–1.4)
EOSINOPHIL # BLD AUTO: 0.38 K/UL (ref 0–0.51)
EOSINOPHIL NFR BLD: 3.5 % (ref 0–6.9)
ERYTHROCYTE [DISTWIDTH] IN BLOOD BY AUTOMATED COUNT: 36.5 FL (ref 35.9–50)
GFR SERPL CREATININE-BSD FRML MDRD: >60 ML/MIN/1.73 M 2
GLOBULIN SER CALC-MCNC: 3.5 G/DL (ref 1.9–3.5)
GLUCOSE SERPL-MCNC: 99 MG/DL (ref 65–99)
HCT VFR BLD AUTO: 45.6 % (ref 42–52)
HGB BLD-MCNC: 16 G/DL (ref 14–18)
IMM GRANULOCYTES # BLD AUTO: 0.05 K/UL (ref 0–0.11)
IMM GRANULOCYTES NFR BLD AUTO: 0.5 % (ref 0–0.9)
LYMPHOCYTES # BLD AUTO: 3.36 K/UL (ref 1–4.8)
LYMPHOCYTES NFR BLD: 30.8 % (ref 22–41)
MCH RBC QN AUTO: 29.2 PG (ref 27–33)
MCHC RBC AUTO-ENTMCNC: 35.1 G/DL (ref 33.7–35.3)
MCV RBC AUTO: 83.2 FL (ref 81.4–97.8)
MONOCYTES # BLD AUTO: 1.04 K/UL (ref 0–0.85)
MONOCYTES NFR BLD AUTO: 9.5 % (ref 0–13.4)
NEUTROPHILS # BLD AUTO: 5.98 K/UL (ref 1.82–7.42)
NEUTROPHILS NFR BLD: 54.8 % (ref 44–72)
NRBC # BLD AUTO: 0 K/UL
NRBC BLD AUTO-RTO: 0 /100 WBC
PLATELET # BLD AUTO: 293 K/UL (ref 164–446)
PMV BLD AUTO: 8.9 FL (ref 9–12.9)
POTASSIUM SERPL-SCNC: 4 MMOL/L (ref 3.6–5.5)
PROT SERPL-MCNC: 7.6 G/DL (ref 6–8.2)
RBC # BLD AUTO: 5.48 M/UL (ref 4.7–6.1)
SODIUM SERPL-SCNC: 137 MMOL/L (ref 135–145)
WBC # BLD AUTO: 10.9 K/UL (ref 4.8–10.8)

## 2017-12-07 PROCEDURE — 36415 COLL VENOUS BLD VENIPUNCTURE: CPT

## 2017-12-07 PROCEDURE — 87522 HEPATITIS C REVRS TRNSCRPJ: CPT

## 2017-12-07 PROCEDURE — 85025 COMPLETE CBC W/AUTO DIFF WBC: CPT

## 2017-12-07 PROCEDURE — 80053 COMPREHEN METABOLIC PANEL: CPT

## 2017-12-13 LAB
HCV RNA SERPL NAA+PROBE-ACNC: NORMAL IU/ML
TEST INFO  93644: NORMAL

## 2018-01-26 ENCOUNTER — OFFICE VISIT (OUTPATIENT)
Dept: MEDICAL GROUP | Facility: MEDICAL CENTER | Age: 38
End: 2018-01-26
Payer: COMMERCIAL

## 2018-01-26 VITALS
HEIGHT: 77 IN | HEART RATE: 100 BPM | BODY MASS INDEX: 37.19 KG/M2 | OXYGEN SATURATION: 96 % | RESPIRATION RATE: 16 BRPM | DIASTOLIC BLOOD PRESSURE: 82 MMHG | TEMPERATURE: 99 F | SYSTOLIC BLOOD PRESSURE: 140 MMHG | WEIGHT: 315 LBS

## 2018-01-26 DIAGNOSIS — E66.01 MORBID OBESITY WITH BMI OF 40.0-44.9, ADULT (HCC): ICD-10-CM

## 2018-01-26 DIAGNOSIS — R05.9 COUGH: ICD-10-CM

## 2018-01-26 DIAGNOSIS — J00 ACUTE NASOPHARYNGITIS: ICD-10-CM

## 2018-01-26 LAB
FLUAV+FLUBV AG SPEC QL IA: NORMAL
INT CON NEG: NEGATIVE
INT CON POS: POSITIVE

## 2018-01-26 PROCEDURE — 99213 OFFICE O/P EST LOW 20 MIN: CPT | Performed by: INTERNAL MEDICINE

## 2018-01-26 PROCEDURE — 87804 INFLUENZA ASSAY W/OPTIC: CPT | Performed by: INTERNAL MEDICINE

## 2018-01-26 RX ORDER — PROMETHAZINE HYDROCHLORIDE AND CODEINE PHOSPHATE 6.25; 1 MG/5ML; MG/5ML
5 SYRUP ORAL 4 TIMES DAILY PRN
Qty: 120 ML | Refills: 0 | Status: SHIPPED | OUTPATIENT
Start: 2018-01-26 | End: 2018-01-31

## 2018-01-26 NOTE — PROGRESS NOTES
"Subjective:     Chief Complaint   Patient presents with   • Flu Like Symptoms     Lake Rossi is a 37 y.o. male here today for evaluation of an upper respiratory infection that he has had since Tuesday    Morbid obesity with BMI of 40.0-44.9, adult ([facility])  This patient is quite overweight. He was encouraged in a weight loss program.    Acute nasopharyngitis  This patient reports that on Tuesday he developed a lot of congestion and a cough that keeps him awake at night along with some myalgias and a temperature to 103°F. He denies actual pharyngitis. Point-of-care influenza test was negative.       Diagnoses of Cough, Acute nasopharyngitis, and Morbid obesity with BMI of 40.0-44.9, adult ([facility]) were pertinent to this visit.    Allergies: Patient has no known allergies.  Current medicines (including changes today)  Current Outpatient Prescriptions   Medication Sig Dispense Refill   • promethazine-codeine (PHENERGAN-CODEINE) 6.25-10 MG/5ML Syrup Take 5 mL by mouth 4 times a day as needed for Cough for up to 5 days. 120 mL 0     No current facility-administered medications for this visit.        He  has a past medical history of Acute nasopharyngitis (1/26/2018) and PTSD (post-traumatic stress disorder).    ROS    Patient denies significant change in strength, weight or appetite.  No significant lightheadedness or headaches.  No change in vision, hearing, or swallowing.  No new dyspnea, coughing, chest pain, or palpitations except for the symptoms he has had since Tuesday..  No indigestion, abdominal pain, or change in bowel habits.  No change in urinating.  No new ankle swelling.       Objective:     PE:  /82   Pulse 100   Temp 37.2 °C (99 °F)   Resp 16   Ht 1.956 m (6' 5\")   Wt (!) 169.6 kg (374 lb)   SpO2 96%   BMI 44.35 kg/m²    Neck is supple without significant lymphadenopathy or masses. No percussion tenderness over sinuses.  Lungs are clear with normal breath sounds without wheezes or rales " .  Cardiovascular: peripheral circulation is satisfactory, heart sounds are unchanged and unremarkable.  Abdomen is soft, without masses or tenderness, with normal bowel sounds.  Extremities are without significant edema, cyanosis or deformity.      Assessment and Plan:   The following treatment plan was discussed  1. Cough  POCT Influenza A/B    promethazine-codeine (PHENERGAN-CODEINE) 6.25-10 MG/5ML Syrup    Phenergan with codeine. He denies allergy. We reviewed potential side effects to the medication.   2. Acute nasopharyngitis      Mucinex, steroid nasal spray, Delsym during the day, Phenergan with codeine at night.   3. Morbid obesity with BMI of 40.0-44.9, adult (HCC)      Weight loss.       Followup: He was encouraged to follow up regularly with his primary care physician. It is not improving or if he worsens, he will contact us.

## 2018-01-26 NOTE — ASSESSMENT & PLAN NOTE
This patient reports that on Tuesday he developed a lot of congestion and a cough that keeps him awake at night along with some myalgias and a temperature to 103°F. He denies actual pharyngitis. Point-of-care influenza test was negative.

## 2018-03-02 ENCOUNTER — HOSPITAL ENCOUNTER (OUTPATIENT)
Dept: LAB | Facility: MEDICAL CENTER | Age: 38
End: 2018-03-02
Attending: INTERNAL MEDICINE
Payer: COMMERCIAL

## 2018-03-02 LAB
ALBUMIN SERPL BCP-MCNC: 4.2 G/DL (ref 3.2–4.9)
ALBUMIN/GLOB SERPL: 1.2 G/DL
ALP SERPL-CCNC: 90 U/L (ref 30–99)
ALT SERPL-CCNC: 28 U/L (ref 2–50)
ANION GAP SERPL CALC-SCNC: 9 MMOL/L (ref 0–11.9)
AST SERPL-CCNC: 23 U/L (ref 12–45)
BASOPHILS # BLD AUTO: 0.9 % (ref 0–1.8)
BASOPHILS # BLD: 0.11 K/UL (ref 0–0.12)
BILIRUB SERPL-MCNC: 0.3 MG/DL (ref 0.1–1.5)
BUN SERPL-MCNC: 17 MG/DL (ref 8–22)
CALCIUM SERPL-MCNC: 9 MG/DL (ref 8.5–10.5)
CHLORIDE SERPL-SCNC: 109 MMOL/L (ref 96–112)
CO2 SERPL-SCNC: 24 MMOL/L (ref 20–33)
CREAT SERPL-MCNC: 1.06 MG/DL (ref 0.5–1.4)
EOSINOPHIL # BLD AUTO: 0.49 K/UL (ref 0–0.51)
EOSINOPHIL NFR BLD: 4.2 % (ref 0–6.9)
ERYTHROCYTE [DISTWIDTH] IN BLOOD BY AUTOMATED COUNT: 38.4 FL (ref 35.9–50)
GLOBULIN SER CALC-MCNC: 3.6 G/DL (ref 1.9–3.5)
GLUCOSE SERPL-MCNC: 93 MG/DL (ref 65–99)
HCT VFR BLD AUTO: 49.1 % (ref 42–52)
HGB BLD-MCNC: 16 G/DL (ref 14–18)
IMM GRANULOCYTES # BLD AUTO: 0.03 K/UL (ref 0–0.11)
IMM GRANULOCYTES NFR BLD AUTO: 0.3 % (ref 0–0.9)
LYMPHOCYTES # BLD AUTO: 3.47 K/UL (ref 1–4.8)
LYMPHOCYTES NFR BLD: 29.9 % (ref 22–41)
MCH RBC QN AUTO: 27.8 PG (ref 27–33)
MCHC RBC AUTO-ENTMCNC: 32.6 G/DL (ref 33.7–35.3)
MCV RBC AUTO: 85.4 FL (ref 81.4–97.8)
MONOCYTES # BLD AUTO: 1.06 K/UL (ref 0–0.85)
MONOCYTES NFR BLD AUTO: 9.1 % (ref 0–13.4)
NEUTROPHILS # BLD AUTO: 6.45 K/UL (ref 1.82–7.42)
NEUTROPHILS NFR BLD: 55.6 % (ref 44–72)
NRBC # BLD AUTO: 0 K/UL
NRBC BLD-RTO: 0 /100 WBC
PLATELET # BLD AUTO: 304 K/UL (ref 164–446)
PMV BLD AUTO: 8.5 FL (ref 9–12.9)
POTASSIUM SERPL-SCNC: 4 MMOL/L (ref 3.6–5.5)
PROT SERPL-MCNC: 7.8 G/DL (ref 6–8.2)
RBC # BLD AUTO: 5.75 M/UL (ref 4.7–6.1)
SODIUM SERPL-SCNC: 142 MMOL/L (ref 135–145)
WBC # BLD AUTO: 11.6 K/UL (ref 4.8–10.8)

## 2018-03-02 PROCEDURE — 80053 COMPREHEN METABOLIC PANEL: CPT

## 2018-03-02 PROCEDURE — 87522 HEPATITIS C REVRS TRNSCRPJ: CPT

## 2018-03-02 PROCEDURE — 36415 COLL VENOUS BLD VENIPUNCTURE: CPT

## 2018-03-02 PROCEDURE — 85025 COMPLETE CBC W/AUTO DIFF WBC: CPT

## 2018-03-07 LAB
HCV RNA SERPL NAA+PROBE-ACNC: NORMAL IU/ML
TEST INFO  93644: NORMAL

## 2018-06-28 ENCOUNTER — OFFICE VISIT (OUTPATIENT)
Dept: MEDICAL GROUP | Facility: CLINIC | Age: 38
End: 2018-06-28
Payer: COMMERCIAL

## 2018-06-28 ENCOUNTER — HOSPITAL ENCOUNTER (EMERGENCY)
Facility: MEDICAL CENTER | Age: 38
End: 2018-06-28
Attending: EMERGENCY MEDICINE
Payer: COMMERCIAL

## 2018-06-28 ENCOUNTER — APPOINTMENT (OUTPATIENT)
Dept: RADIOLOGY | Facility: MEDICAL CENTER | Age: 38
End: 2018-06-28
Attending: EMERGENCY MEDICINE
Payer: COMMERCIAL

## 2018-06-28 VITALS
DIASTOLIC BLOOD PRESSURE: 90 MMHG | HEART RATE: 74 BPM | SYSTOLIC BLOOD PRESSURE: 122 MMHG | BODY MASS INDEX: 37.19 KG/M2 | RESPIRATION RATE: 16 BRPM | HEIGHT: 77 IN | OXYGEN SATURATION: 94 % | WEIGHT: 315 LBS | TEMPERATURE: 98.3 F

## 2018-06-28 VITALS
BODY MASS INDEX: 37.19 KG/M2 | HEIGHT: 77 IN | RESPIRATION RATE: 17 BRPM | HEART RATE: 72 BPM | DIASTOLIC BLOOD PRESSURE: 81 MMHG | WEIGHT: 315 LBS | OXYGEN SATURATION: 92 % | TEMPERATURE: 97.8 F | SYSTOLIC BLOOD PRESSURE: 115 MMHG

## 2018-06-28 DIAGNOSIS — R07.81 PLEURITIC CHEST PAIN: ICD-10-CM

## 2018-06-28 DIAGNOSIS — Z87.898 HISTORY OF CHEST PAIN: ICD-10-CM

## 2018-06-28 DIAGNOSIS — F19.11 HISTORY OF SUBSTANCE ABUSE (HCC): ICD-10-CM

## 2018-06-28 PROBLEM — F19.10 SUBSTANCE ABUSE (HCC): Status: RESOLVED | Noted: 2017-06-02 | Resolved: 2018-06-28

## 2018-06-28 LAB
ALBUMIN SERPL BCP-MCNC: 4.1 G/DL (ref 3.2–4.9)
ALBUMIN/GLOB SERPL: 1.1 G/DL
ALP SERPL-CCNC: 90 U/L (ref 30–99)
ALT SERPL-CCNC: 27 U/L (ref 2–50)
ANION GAP SERPL CALC-SCNC: 10 MMOL/L (ref 0–11.9)
APTT PPP: 29.4 SEC (ref 24.7–36)
AST SERPL-CCNC: 23 U/L (ref 12–45)
BASOPHILS # BLD AUTO: 0.6 % (ref 0–1.8)
BASOPHILS # BLD: 0.08 K/UL (ref 0–0.12)
BILIRUB SERPL-MCNC: 0.6 MG/DL (ref 0.1–1.5)
BNP SERPL-MCNC: 5 PG/ML (ref 0–100)
BUN SERPL-MCNC: 14 MG/DL (ref 8–22)
CALCIUM SERPL-MCNC: 9.2 MG/DL (ref 8.5–10.5)
CHLORIDE SERPL-SCNC: 106 MMOL/L (ref 96–112)
CO2 SERPL-SCNC: 24 MMOL/L (ref 20–33)
CREAT SERPL-MCNC: 0.85 MG/DL (ref 0.5–1.4)
EKG IMPRESSION: NORMAL
EOSINOPHIL # BLD AUTO: 0.45 K/UL (ref 0–0.51)
EOSINOPHIL NFR BLD: 3.3 % (ref 0–6.9)
ERYTHROCYTE [DISTWIDTH] IN BLOOD BY AUTOMATED COUNT: 36.9 FL (ref 35.9–50)
GLOBULIN SER CALC-MCNC: 3.7 G/DL (ref 1.9–3.5)
GLUCOSE SERPL-MCNC: 91 MG/DL (ref 65–99)
HCT VFR BLD AUTO: 44.9 % (ref 42–52)
HGB BLD-MCNC: 15.4 G/DL (ref 14–18)
IMM GRANULOCYTES # BLD AUTO: 0.04 K/UL (ref 0–0.11)
IMM GRANULOCYTES NFR BLD AUTO: 0.3 % (ref 0–0.9)
INR PPP: 0.96 (ref 0.87–1.13)
LIPASE SERPL-CCNC: 14 U/L (ref 11–82)
LYMPHOCYTES # BLD AUTO: 3.87 K/UL (ref 1–4.8)
LYMPHOCYTES NFR BLD: 28.3 % (ref 22–41)
MCH RBC QN AUTO: 28.4 PG (ref 27–33)
MCHC RBC AUTO-ENTMCNC: 34.3 G/DL (ref 33.7–35.3)
MCV RBC AUTO: 82.8 FL (ref 81.4–97.8)
MONOCYTES # BLD AUTO: 1.07 K/UL (ref 0–0.85)
MONOCYTES NFR BLD AUTO: 7.8 % (ref 0–13.4)
NEUTROPHILS # BLD AUTO: 8.15 K/UL (ref 1.82–7.42)
NEUTROPHILS NFR BLD: 59.7 % (ref 44–72)
NRBC # BLD AUTO: 0 K/UL
NRBC BLD-RTO: 0 /100 WBC
PLATELET # BLD AUTO: 277 K/UL (ref 164–446)
PMV BLD AUTO: 8.3 FL (ref 9–12.9)
POTASSIUM SERPL-SCNC: 3.8 MMOL/L (ref 3.6–5.5)
PROT SERPL-MCNC: 7.8 G/DL (ref 6–8.2)
PROTHROMBIN TIME: 12.5 SEC (ref 12–14.6)
RBC # BLD AUTO: 5.42 M/UL (ref 4.7–6.1)
SODIUM SERPL-SCNC: 140 MMOL/L (ref 135–145)
TROPONIN I SERPL-MCNC: <0.01 NG/ML (ref 0–0.04)
TROPONIN I SERPL-MCNC: <0.01 NG/ML (ref 0–0.04)
WBC # BLD AUTO: 13.7 K/UL (ref 4.8–10.8)

## 2018-06-28 PROCEDURE — 84484 ASSAY OF TROPONIN QUANT: CPT

## 2018-06-28 PROCEDURE — 85610 PROTHROMBIN TIME: CPT

## 2018-06-28 PROCEDURE — 85025 COMPLETE CBC W/AUTO DIFF WBC: CPT

## 2018-06-28 PROCEDURE — 83880 ASSAY OF NATRIURETIC PEPTIDE: CPT

## 2018-06-28 PROCEDURE — 85730 THROMBOPLASTIN TIME PARTIAL: CPT

## 2018-06-28 PROCEDURE — 99284 EMERGENCY DEPT VISIT MOD MDM: CPT

## 2018-06-28 PROCEDURE — 99213 OFFICE O/P EST LOW 20 MIN: CPT | Performed by: INTERNAL MEDICINE

## 2018-06-28 PROCEDURE — 71045 X-RAY EXAM CHEST 1 VIEW: CPT

## 2018-06-28 PROCEDURE — 80053 COMPREHEN METABOLIC PANEL: CPT

## 2018-06-28 PROCEDURE — 93005 ELECTROCARDIOGRAM TRACING: CPT | Performed by: EMERGENCY MEDICINE

## 2018-06-28 PROCEDURE — 83690 ASSAY OF LIPASE: CPT

## 2018-06-28 PROCEDURE — 93005 ELECTROCARDIOGRAM TRACING: CPT

## 2018-06-28 RX ORDER — OMEPRAZOLE 20 MG/1
20 CAPSULE, DELAYED RELEASE ORAL DAILY
Qty: 30 CAP | Refills: 2 | Status: SHIPPED
Start: 2018-06-28 | End: 2018-09-06 | Stop reason: SDUPTHER

## 2018-06-28 RX ORDER — OMEPRAZOLE 20 MG/1
CAPSULE, DELAYED RELEASE ORAL
COMMUNITY
Start: 2018-06-17 | End: 2018-09-06

## 2018-06-28 ASSESSMENT — PATIENT HEALTH QUESTIONNAIRE - PHQ9: CLINICAL INTERPRETATION OF PHQ2 SCORE: 0

## 2018-06-28 NOTE — PROGRESS NOTES
CC: Lake Rossi is a 38 y.o. male is suffering from   Chief Complaint   Patient presents with   • Chest Pain   • Other     spot on left foot    • Gastrophageal Reflux         SUBJECTIVE:  1. History of chest pain  Patient is here for follow-up, has a history of chest pain last extremes proximally 3-4 hours prior. States it was not sustained did not radiate did not cause nausea vomiting or diaphoresis.states however over the past 2 weeks he's had one or 2 episodes of pain. Patient works as a .    2. History of substance abuse  Patient with a history of substance abuse states that he's been 3-4 years clean and sober is attending meetings each week. Lake Rossi 2-year-old male states he sustained nausea and diaphoresis with states he is patient's vital signs are stable        Past social, family, history: Single.   Social History   Substance Use Topics   • Smoking status: Current Some Day Smoker     Packs/day: 0.50   • Smokeless tobacco: Current User      Comment: 8 cigarettes and vaping   • Alcohol use No      Comment: alcoholism October 18, 2014 recovering addict         MEDICATIONS:    Current Outpatient Prescriptions:   •  omeprazole (PRILOSEC) 20 MG delayed-release capsule, , Disp: , Rfl:     PROBLEMS:  Patient Active Problem List    Diagnosis Date Noted   • History of substance abuse 06/28/2018   • Acute nasopharyngitis 01/26/2018   • Elevated liver enzymes 06/27/2017   • Current tobacco use 06/06/2017   • Morbid obesity with BMI of 40.0-44.9, adult ([facility]) 06/02/2017       REVIEW OF SYSTEMS:  Gen.:  No Nausea, Vomiting, fever, Chills.  Heart: No chest pain.  Lungs:  No shortness of Breath.  Psychological: Des unusual Anxiety depression     PHYSICAL EXAM   Constitutional: Alert, cooperative, not in acute distress.  Cardiovascular:  Rate Rhythm is regular without murmurs rubs clicks.  No reproducible chest pain to palpation    Thorax & Lungs: Clear to auscultation, no wheezing, rhonchi, or  "rales  HENT: Normocephalic, Atraumatic.  Eyes: PERRLA, EOMI, Conjunctiva normal.   Neck: Trachia is midline no swelling of the thyroid.   Lymphatic: No lymphadenopathy noted.   Neurologic: Alert & oriented x 3, cranial nerves II through XII are intact, Normal motor function, Normal sensory function, No focal deficits noted.   Psychiatric: Affect normal, Judgment normal, Mood normal.     VITAL SIGNS:/90   Pulse 74   Temp 36.8 °C (98.3 °F)   Resp 16   Ht 1.956 m (6' 5\")   Wt (!) 174.6 kg (385 lb)   SpO2 94%   BMI 45.65 kg/m²     Labs: Reviewed    Assessment:                                                     Plan:    1. History of chest pain  History of chest pain last episode approximately 3-4 hours prior with a history of chest pain/discomfort starting within the last 2 weeks.  EKG EKG interpretation sinus rhythm questionable ST segment elevation laterally normal axis.  - EKG    2. History of substance abuse  Patient with a history of substance abuse clinically stable.     Case was reviewed and discussed with [facility] physician at the emergency room    "

## 2018-06-28 NOTE — ED TRIAGE NOTES
"Chief Complaint   Patient presents with   • Sent from Urgent Care   • Chest Pain     x several weeks, intermittent \"just a little bit here and there.\"  Pt denies CP being worse on inspiration.  Non-radiating.    • Shortness of Breath     x several weeks.    Pt ambulates to triage in NAD.  EKG completed.  Pt educated on triage process and instructed to notify triage RN of any change in status.          "

## 2018-06-29 NOTE — DISCHARGE INSTRUCTIONS
Chest Wall Pain  Chest wall pain is pain in or around the bones and muscles of your chest. Sometimes, an injury causes this pain. Sometimes, the cause may not be known. This pain may take several weeks or longer to get better.  Follow these instructions at home:  Pay attention to any changes in your symptoms. Take these actions to help with your pain:  · Rest as told by your health care provider.  · Avoid activities that cause pain. These include any activities that use your chest muscles or your abdominal and side muscles to lift heavy items.  · If directed, apply ice to the painful area:  ¨ Put ice in a plastic bag.  ¨ Place a towel between your skin and the bag.  ¨ Leave the ice on for 20 minutes, 2-3 times per day.  · Take over-the-counter and prescription medicines only as told by your health care provider.  · Do not use tobacco products, including cigarettes, chewing tobacco, and e-cigarettes. If you need help quitting, ask your health care provider.  · Keep all follow-up visits as told by your health care provider. This is important.  Contact a health care provider if:  · You have a fever.  · Your chest pain becomes worse.  · You have new symptoms.  Get help right away if:  · You have nausea or vomiting.  · You feel sweaty or light-headed.  · You have a cough with phlegm (sputum) or you cough up blood.  · You develop shortness of breath.  This information is not intended to replace advice given to you by your health care provider. Make sure you discuss any questions you have with your health care provider.  Document Released: 12/18/2006 Document Revised: 04/27/2017 Document Reviewed: 03/14/2016  [employer] Interactive Patient Education © 2017 [employer]    Chest Pain, Nonspecific  It is often hard to give a specific diagnosis for the cause of chest pain. There is always a chance that your pain could be related to something serious, like a heart attack or a blood clot in the lungs. You need to follow up with  your caregiver for further evaluation. More lab tests or other studies such as X-rays, electrocardiography, stress testing, or cardiac imaging may be needed to find the cause of your pain.  Most of the time, nonspecific chest pain improves within 2 to 3 days with rest and mild pain medicine. For the next few days, avoid physical exertion or activities that bring on pain. Do not smoke. Avoid drinking alcohol. Call your caregiver for routine follow-up as advised.   SEEK IMMEDIATE MEDICAL CARE IF:  · You develop increased chest pain or pain that radiates to the arm, neck, jaw, back, or abdomen.   · You develop shortness of breath, increased coughing, or you start coughing up blood.   · You have severe back or abdominal pain, nausea, or vomiting.   · You develop severe weakness, fainting, fever, or chills.   Document Released: 12/18/2006 Document Revised: 03/11/2013 Document Reviewed: 06/06/2008  [employer]® Patient Information ©2013 [employer], [email or web address].

## 2018-06-29 NOTE — ED NOTES
Patient is resting comfortably.respirations even and non labored, NAD., blood pressure cuff reapplied 2nd to imaging

## 2018-06-29 NOTE — ED NOTES
ERP at bedside to discuss discharge, Pt provided w/ AVS, education for follow-up, all questions answered. PIV removed and site bandaged.

## 2018-06-29 NOTE — ED PROVIDER NOTES
"  CHIEF COMPLAINT  Chief Complaint   Patient presents with   • Sent from Urgent Care   • Chest Pain     x several weeks, intermittent \"just a little bit here and there.\"  Pt denies CP being worse on inspiration.  Non-radiating.    • Shortness of Breath     x several weeks.        HPI  Lake Rossi is a 38 y.o. male here for evaluation of chest pain.  He states his pain has bee intermittent over the last few weeks, but is only fleeting, comes and goes, and usually does not return.  He states it is in the top of his chest, is non radiating, and not associated with sob/nausea/vomiting/diaphoresis.  He denies any history of the same, and denies any current chest pain.  Nothing alleviates or exacerbates it.       PAST MEDICAL HISTORY   has a past medical history of Acute nasopharyngitis (1/26/2018); GERD (gastroesophageal reflux disease); and PTSD (post-traumatic stress disorder).    SOCIAL HISTORY  Social History     Social History Main Topics   • Smoking status: Current Some Day Smoker     Packs/day: 0.50   • Smokeless tobacco: Current User      Comment: 8 cigarettes and vaping   • Alcohol use No      Comment: alcoholism October 18, 2014 recovering addict   • Drug use: No      Comment: Heroin last used 2014   • Sexual activity: Yes     Partners: Female       SURGICAL HISTORY  patient denies any surgical history    CURRENT MEDICATIONS  Home Medications    **Home medications have not yet been reviewed for this encounter**         ALLERGIES  No Known Allergies    REVIEW OF SYSTEMS  See HPI for further details. Review of systems as above, otherwise all other systems are negative.     PHYSICAL EXAM  Constitutional: Well developed, well nourished. No acute distress.  HEENT: Normocephalic, atraumatic. Posterior pharynx clear and moist.  Eyes:  EOMI. Normal sclera.  Neck: Supple, Full range of motion, nontender.  Chest/Pulmonary: clear to ausculation. Symmetrical expansion.   Cardio: Regular rate and rhythm with no " murmur.   Abdomen: Soft, nontender. No peritoneal signs. No guarding. No palpable masses.  Back: No CVA tenderness, nontender midline, no step offs.  Musculoskeletal: No deformity, no edema, neurovascular intact.   Neuro: Clear speech, appropriate, cooperative, cranial nerves II-XII grossly intact.  Psych: Normal mood and affect    Results for orders placed or performed during the hospital encounter of 06/28/18   CBC with Differential   Result Value Ref Range    WBC 13.7 (H) 4.8 - 10.8 K/uL    RBC 5.42 4.70 - 6.10 M/uL    Hemoglobin 15.4 14.0 - 18.0 g/dL    Hematocrit 44.9 42.0 - 52.0 %    MCV 82.8 81.4 - 97.8 fL    MCH 28.4 27.0 - 33.0 pg    MCHC 34.3 33.7 - 35.3 g/dL    RDW 36.9 35.9 - 50.0 fL    Platelet Count 277 164 - 446 K/uL    MPV 8.3 (L) 9.0 - 12.9 fL    Neutrophils-Polys 59.70 44.00 - 72.00 %    Lymphocytes 28.30 22.00 - 41.00 %    Monocytes 7.80 0.00 - 13.40 %    Eosinophils 3.30 0.00 - 6.90 %    Basophils 0.60 0.00 - 1.80 %    Immature Granulocytes 0.30 0.00 - 0.90 %    Nucleated RBC 0.00 /100 WBC    Neutrophils (Absolute) 8.15 (H) 1.82 - 7.42 K/uL    Lymphs (Absolute) 3.87 1.00 - 4.80 K/uL    Monos (Absolute) 1.07 (H) 0.00 - 0.85 K/uL    Eos (Absolute) 0.45 0.00 - 0.51 K/uL    Baso (Absolute) 0.08 0.00 - 0.12 K/uL    Immature Granulocytes (abs) 0.04 0.00 - 0.11 K/uL    NRBC (Absolute) 0.00 K/uL   Complete Metabolic Panel (CMP)   Result Value Ref Range    Sodium 140 135 - 145 mmol/L    Potassium 3.8 3.6 - 5.5 mmol/L    Chloride 106 96 - 112 mmol/L    Co2 24 20 - 33 mmol/L    Anion Gap 10.0 0.0 - 11.9    Glucose 91 65 - 99 mg/dL    Bun 14 8 - 22 mg/dL    Creatinine 0.85 0.50 - 1.40 mg/dL    Calcium 9.2 8.5 - 10.5 mg/dL    AST(SGOT) 23 12 - 45 U/L    ALT(SGPT) 27 2 - 50 U/L    Alkaline Phosphatase 90 30 - 99 U/L    Total Bilirubin 0.6 0.1 - 1.5 mg/dL    Albumin 4.1 3.2 - 4.9 g/dL    Total Protein 7.8 6.0 - 8.2 g/dL    Globulin 3.7 (H) 1.9 - 3.5 g/dL    A-G Ratio 1.1 g/dL   Btype Natriuretic Peptide (BNP)    Result Value Ref Range    B Natriuretic Peptide 5 0 - 100 pg/mL   Prothrombin Time (PT/INR)   Result Value Ref Range    PT 12.5 12.0 - 14.6 sec    INR 0.96 0.87 - 1.13   APTT   Result Value Ref Range    APTT 29.4 24.7 - 36.0 sec   Lipase   Result Value Ref Range    Lipase 14 11 - 82 U/L   Troponin STAT   Result Value Ref Range    Troponin I <0.01 0.00 - 0.04 ng/mL   ESTIMATED GFR   Result Value Ref Range    GFR If African American >60 >60 mL/min/1.73 m 2    GFR If Non African American >60 >60 mL/min/1.73 m 2   TROPONIN   Result Value Ref Range    Troponin I <0.01 0.00 - 0.04 ng/mL   EKG (NOW)   Result Value Ref Range    Report       [facility] Emergency Dept.    Test Date:  2018  Pt Name:    LOCO MISTRY               Department: ER  MRN:        [id number]                      Room:  Gender:     Male                         Technician: [id number]  :        1980                   Requested By:ER TRIAGE PROTOCOL  Order #:    [id number]                    Reading MD:    Measurements  Intervals                                Axis  Rate:       76                           P:          33  NE:         184                          QRS:        37  QRSD:       90                           T:          65  QT:         396  QTc:        446    Interpretive Statements  SINUS RHYTHM  No previous ECG available for comparison       DX-CHEST-PORTABLE (1 VIEW)   Final Result      No acute cardiopulmonary disease.            PROCEDURES     MEDICAL RECORD  I have reviewed patient's medical record and pertinent results are listed above.    COURSE & MEDICAL DECISION MAKING  I have reviewed any medical record information, laboratory studies and radiographic results as noted above.    If you have had any blood pressure issues while here in the emergency department, please see your doctor for a further evaluation or work up.    7:47 PM  The pt has no current pain.  He has two negative trops, a normal ekg,  negative cxr, no risk factors, and a heart score of zero.  He is comfortable going home, and agrees to follow up in 1-2 days.  He denies any long travel, and has no sob.     Differential diagnoses include but not limited to: gerd, mi, pe, ptx, pneumonia.     This patient presents with chest pain.  At this time, I have counseled the patient/family regarding their medications, pain control, and follow up.  They will continue their medications, if any, as prescribed.  They will return immediately for any worsening symptoms and/or any other medical concerns.  They will see their doctor, or contact the doctor provided, in 1-2 days for follow up.       FINAL IMPRESSION  1. Pleuritic chest pain            Electronically signed by: Warren Levin, 6/28/2018 7:46 PM

## 2018-09-06 ENCOUNTER — OFFICE VISIT (OUTPATIENT)
Dept: MEDICAL GROUP | Facility: PHYSICIAN GROUP | Age: 38
End: 2018-09-06
Payer: COMMERCIAL

## 2018-09-06 VITALS
OXYGEN SATURATION: 92 % | WEIGHT: 315 LBS | BODY MASS INDEX: 37.19 KG/M2 | RESPIRATION RATE: 16 BRPM | HEIGHT: 77 IN | TEMPERATURE: 97.6 F | DIASTOLIC BLOOD PRESSURE: 80 MMHG | SYSTOLIC BLOOD PRESSURE: 122 MMHG | HEART RATE: 84 BPM

## 2018-09-06 DIAGNOSIS — K21.9 GASTROESOPHAGEAL REFLUX DISEASE, ESOPHAGITIS PRESENCE NOT SPECIFIED: ICD-10-CM

## 2018-09-06 DIAGNOSIS — Z76.89 ENCOUNTER TO ESTABLISH CARE WITH NEW DOCTOR: ICD-10-CM

## 2018-09-06 DIAGNOSIS — R53.83 FATIGUE, UNSPECIFIED TYPE: ICD-10-CM

## 2018-09-06 DIAGNOSIS — L98.9 SKIN LESION: ICD-10-CM

## 2018-09-06 DIAGNOSIS — Z00.00 WELLNESS EXAMINATION: ICD-10-CM

## 2018-09-06 PROBLEM — J00 ACUTE NASOPHARYNGITIS: Status: RESOLVED | Noted: 2018-01-26 | Resolved: 2018-09-06

## 2018-09-06 PROCEDURE — 99213 OFFICE O/P EST LOW 20 MIN: CPT | Performed by: NURSE PRACTITIONER

## 2018-09-06 RX ORDER — CLOTRIMAZOLE AND BETAMETHASONE DIPROPIONATE 10; .64 MG/G; MG/G
1 CREAM TOPICAL 2 TIMES DAILY
Qty: 1 TUBE | Refills: 0 | Status: SHIPPED | OUTPATIENT
Start: 2018-09-06 | End: 2019-07-30

## 2018-09-06 RX ORDER — OMEPRAZOLE 20 MG/1
20 CAPSULE, DELAYED RELEASE ORAL DAILY
Qty: 90 CAP | Refills: 3 | Status: SHIPPED | OUTPATIENT
Start: 2018-09-06 | End: 2019-07-30

## 2018-09-06 NOTE — ASSESSMENT & PLAN NOTE
Is here to establish with a new primary care provider.  Was previously seen by Rosa Isela Darling.

## 2018-10-08 PROBLEM — L98.9 SKIN LESION: Status: ACTIVE | Noted: 2018-10-08

## 2018-10-08 PROBLEM — R74.8 ELEVATED LIVER ENZYMES: Status: RESOLVED | Noted: 2017-06-27 | Resolved: 2018-10-08

## 2018-10-08 PROBLEM — K21.9 GASTROESOPHAGEAL REFLUX DISEASE: Status: ACTIVE | Noted: 2018-10-08

## 2018-10-08 NOTE — PROGRESS NOTES
Chief Complaint   Patient presents with   • Foot Problem       HISTORY OF PRESENT ILLNESS: Patient is a 38 y.o. male new patient who presents today to discuss the following issues:    Encounter to establish care with new doctor  Is here to establish with a new primary care provider.  Was previously seen by Rosa Isela Darling.      BMI 40.0-44.9, adult ([facility])  Patient is aware of BMI elevation.  Brief discussion of diet, exercise, and lifestyle modification.      Skin lesion  Has a dry patch of skin.  Would like a refill of lotrisone.    Gastroesophageal reflux disease  Gerd is stable.  Would like a refill of omeprazle.      Patient Active Problem List    Diagnosis Date Noted   • Skin lesion 10/08/2018   • Gastroesophageal reflux disease 10/08/2018   • Encounter to establish care with new doctor 09/06/2018   • History of substance abuse 06/28/2018   • Current tobacco use 06/06/2017   • BMI 40.0-44.9, adult ([facility]) 06/02/2017       Allergies:Patient has no known allergies.    Current Outpatient Prescriptions   Medication Sig Dispense Refill   • clotrimazole-betamethasone (LOTRISONE) 1-0.05 % Cream Apply 1 Application to affected area(s) 2 times a day. 1 Tube 0   • omeprazole (PRILOSEC) 20 MG delayed-release capsule Take 1 Cap by mouth every day. 90 Cap 3     No current facility-administered medications for this visit.        Social History   Substance Use Topics   • Smoking status: Current Some Day Smoker     Packs/day: 0.50   • Smokeless tobacco: Current User      Comment: 8 cigarettes and vaping   • Alcohol use No      Comment: alcoholism October 18, 2014 recovering addict       No family status information on file.   History reviewed. No pertinent family history.    Review of Systems:   Constitutional: Negative for fever, chills, weight loss and malaise/fatigue.   HENT: Negative for ear pain, nosebleeds, congestion, sore throat and neck pain.    Eyes: Negative for blurred vision.   Respiratory: Negative for cough,  "sputum production, shortness of breath and wheezing.    Cardiovascular: Negative for chest pain, palpitations, orthopnea and leg swelling.   Gastrointestinal: Negative for heartburn, nausea, vomiting and abdominal pain.   Genitourinary: Negative for dysuria, urgency and frequency.   Musculoskeletal: Negative for myalgias, joint pain, and back pain.  Skin: Negative for rash and itching.  Positive for intermittent dry patches.  Neurological: Negative for dizziness, tingling, tremors, sensory change, focal weakness and headaches.   Endo/Heme/Allergies: Does not bruise/bleed easily.   Psychiatric/Behavioral: Negative for depression, suicidal ideas and memory loss.  The patient is not nervous/anxious and does not have insomnia.    All other systems reviewed and are negative except as in HPI.    Exam:  Blood pressure 122/80, pulse 84, temperature 36.4 °C (97.6 °F), resp. rate 16, height 1.956 m (6' 5\"), weight (!) 171 kg (377 lb), SpO2 92 %.  General:  Well nourished, well developed male in NAD  Head: Grossly normal.  Neck: Supple without JVD or bruit. Thyroid is not enlarged.  Pulmonary: Clear to ausculation. Normal effort. No rales, ronchi, or wheezing.  Cardiovascular: Regular rate and rhythm without murmur.   Abdomen:  Bowel sounds + x 4. Soft, non-tender, nondistended.  Extremities: No clubbing, cyanosis, or edema.  Skin: Intact with no obvious rashes or lesions.  Neuro: Grossly intact.  Psych: Alert and oriented x 3.  Mood and affect appropriate.    Medical decision-making and discussion: Lake is here to establish with a new primary care provider.  We reviewed his past medical history and discussed his current medications.  Lab work was ordered and his medications were refilled. He will sign a records release for his previous provider, [facility], and he will plan to follow-up here as needed.         Assessment/Plan:  1. BMI 40.0-44.9, adult (HCC)  Patient identified as having weight management " issue.  Appropriate orders and counseling given.   2. Encounter to establish care with new doctor     3. Skin lesion  clotrimazole-betamethasone (LOTRISONE) 1-0.05 % Cream   4. Wellness examination  COMP METABOLIC PANEL    TSH    VITAMIN D,25 HYDROXY    TESTOSTERONE SERUM    LIPID PROFILE   5. Fatigue, unspecified type     6. Gastroesophageal reflux disease, esophagitis presence not specified  omeprazole (PRILOSEC) 20 MG delayed-release capsule       Return if symptoms worsen or fail to improve.    Please note that this dictation was created using voice recognition software. I have made every reasonable attempt to correct obvious errors, but I expect that there are errors of grammar and possibly content that I did not discover before finalizing the note.

## 2018-10-27 ENCOUNTER — HOSPITAL ENCOUNTER (OUTPATIENT)
Dept: LAB | Facility: MEDICAL CENTER | Age: 38
End: 2018-10-27
Attending: NURSE PRACTITIONER
Payer: COMMERCIAL

## 2018-10-27 DIAGNOSIS — Z00.00 WELLNESS EXAMINATION: ICD-10-CM

## 2018-10-27 LAB
25(OH)D3 SERPL-MCNC: 26 NG/ML (ref 30–100)
ALBUMIN SERPL BCP-MCNC: 4.5 G/DL (ref 3.2–4.9)
ALBUMIN/GLOB SERPL: 1.2 G/DL
ALP SERPL-CCNC: 88 U/L (ref 30–99)
ALT SERPL-CCNC: 48 U/L (ref 2–50)
ANION GAP SERPL CALC-SCNC: 8 MMOL/L (ref 0–11.9)
AST SERPL-CCNC: 30 U/L (ref 12–45)
BILIRUB SERPL-MCNC: 0.6 MG/DL (ref 0.1–1.5)
BUN SERPL-MCNC: 19 MG/DL (ref 8–22)
CALCIUM SERPL-MCNC: 10.2 MG/DL (ref 8.5–10.5)
CHLORIDE SERPL-SCNC: 105 MMOL/L (ref 96–112)
CHOLEST SERPL-MCNC: 157 MG/DL (ref 100–199)
CO2 SERPL-SCNC: 25 MMOL/L (ref 20–33)
CREAT SERPL-MCNC: 0.92 MG/DL (ref 0.5–1.4)
FASTING STATUS PATIENT QL REPORTED: NORMAL
GLOBULIN SER CALC-MCNC: 3.9 G/DL (ref 1.9–3.5)
GLUCOSE SERPL-MCNC: 96 MG/DL (ref 65–99)
HDLC SERPL-MCNC: 30 MG/DL
LDLC SERPL CALC-MCNC: 92 MG/DL
POTASSIUM SERPL-SCNC: 4 MMOL/L (ref 3.6–5.5)
PROT SERPL-MCNC: 8.4 G/DL (ref 6–8.2)
SODIUM SERPL-SCNC: 138 MMOL/L (ref 135–145)
TESTOST SERPL-MCNC: 174 NG/DL (ref 175–781)
TRIGL SERPL-MCNC: 176 MG/DL (ref 0–149)
TSH SERPL DL<=0.005 MIU/L-ACNC: 1.26 UIU/ML (ref 0.38–5.33)

## 2018-10-27 PROCEDURE — 36415 COLL VENOUS BLD VENIPUNCTURE: CPT

## 2018-10-27 PROCEDURE — 80061 LIPID PANEL: CPT

## 2018-10-27 PROCEDURE — 82306 VITAMIN D 25 HYDROXY: CPT

## 2018-10-27 PROCEDURE — 80053 COMPREHEN METABOLIC PANEL: CPT

## 2018-10-27 PROCEDURE — 84403 ASSAY OF TOTAL TESTOSTERONE: CPT

## 2018-10-27 PROCEDURE — 84443 ASSAY THYROID STIM HORMONE: CPT

## 2019-07-30 ENCOUNTER — HOSPITAL ENCOUNTER (OUTPATIENT)
Dept: LAB | Facility: MEDICAL CENTER | Age: 39
End: 2019-07-30
Attending: NURSE PRACTITIONER
Payer: COMMERCIAL

## 2019-07-30 ENCOUNTER — OFFICE VISIT (OUTPATIENT)
Dept: MEDICAL GROUP | Facility: PHYSICIAN GROUP | Age: 39
End: 2019-07-30
Payer: COMMERCIAL

## 2019-07-30 VITALS
OXYGEN SATURATION: 95 % | DIASTOLIC BLOOD PRESSURE: 84 MMHG | HEIGHT: 77 IN | HEART RATE: 72 BPM | BODY MASS INDEX: 35.89 KG/M2 | TEMPERATURE: 99 F | SYSTOLIC BLOOD PRESSURE: 124 MMHG | RESPIRATION RATE: 16 BRPM | WEIGHT: 304 LBS

## 2019-07-30 DIAGNOSIS — R79.89 ELEVATED LFTS: ICD-10-CM

## 2019-07-30 DIAGNOSIS — E29.1 HYPOGONADISM MALE: ICD-10-CM

## 2019-07-30 PROBLEM — Z76.89 ENCOUNTER TO ESTABLISH CARE WITH NEW DOCTOR: Status: RESOLVED | Noted: 2018-09-06 | Resolved: 2019-07-30

## 2019-07-30 LAB
ALBUMIN SERPL BCP-MCNC: 4.6 G/DL (ref 3.2–4.9)
ALP SERPL-CCNC: 79 U/L (ref 30–99)
ALT SERPL-CCNC: 16 U/L (ref 2–50)
AST SERPL-CCNC: 20 U/L (ref 12–45)
BILIRUB CONJ SERPL-MCNC: <0.1 MG/DL (ref 0.1–0.5)
BILIRUB INDIRECT SERPL-MCNC: NORMAL MG/DL (ref 0–1)
BILIRUB SERPL-MCNC: 0.5 MG/DL (ref 0.1–1.5)
PROT SERPL-MCNC: 7.8 G/DL (ref 6–8.2)

## 2019-07-30 PROCEDURE — 80076 HEPATIC FUNCTION PANEL: CPT

## 2019-07-30 PROCEDURE — 99214 OFFICE O/P EST MOD 30 MIN: CPT | Performed by: NURSE PRACTITIONER

## 2019-07-30 PROCEDURE — 84403 ASSAY OF TOTAL TESTOSTERONE: CPT

## 2019-07-30 PROCEDURE — 36415 COLL VENOUS BLD VENIPUNCTURE: CPT

## 2019-07-30 PROCEDURE — 84270 ASSAY OF SEX HORMONE GLOBUL: CPT

## 2019-07-30 ASSESSMENT — PATIENT HEALTH QUESTIONNAIRE - PHQ9: CLINICAL INTERPRETATION OF PHQ2 SCORE: 0

## 2019-07-30 NOTE — ASSESSMENT & PLAN NOTE
Has a history of Hepatitis, alcohol, and drug abuse.  He reports chronic liver pain and would like to check liver enzymes.

## 2019-07-30 NOTE — PROGRESS NOTES
Chief Complaint   Patient presents with   • Dizziness       HISTORY OF PRESENT ILLNESS: Patient is a 39 y.o. male established patient who presents today to discuss the following issues:    Elevated LFTs  Has a history of Hepatitis, alcohol, and drug abuse.  He reports chronic liver pain and would like to check liver enzymes.    Hypogonadism male  Will check labs and discuss options for treatment.      Patient Active Problem List    Diagnosis Date Noted   • Hypogonadism male 07/30/2019   • Elevated LFTs 07/30/2019   • Skin lesion 10/08/2018   • Gastroesophageal reflux disease 10/08/2018   • History of substance abuse 06/28/2018   • Current tobacco use 06/06/2017   • BMI 40.0-44.9, adult ([facility]) 06/02/2017       Allergies:Patient has no known allergies.    No current outpatient prescriptions on file.     No current facility-administered medications for this visit.        Social History   Substance Use Topics   • Smoking status: Former Smoker     Packs/day: 0.50   • Smokeless tobacco: Current User     Types: Chew      Comment: Uses Vape.    • Alcohol use No      Comment: alcoholism October 18, 2014 recovering addict       No family status information on file.   History reviewed. No pertinent family history.    Review of Systems:   Constitutional: Negative for fever, chills, weight loss and malaise/fatigue.   HENT: Negative for ear pain, nosebleeds, congestion, sore throat and neck pain.    Eyes: Negative for blurred vision.   Respiratory: Negative for cough, sputum production, shortness of breath and wheezing.    Cardiovascular: Negative for chest pain, palpitations, orthopnea and leg swelling.   Gastrointestinal: Negative for heartburn, nausea, vomiting and abdominal pain.   Genitourinary: Negative for dysuria, urgency and frequency.   Musculoskeletal: Negative for myalgias, joint pain, and back pain.  Skin: Negative for rash and itching.   Neurological: Negative for dizziness, tingling, tremors, sensory change, focal  "weakness and headaches.   Endo/Heme/Allergies: Does not bruise/bleed easily.   Psychiatric/Behavioral: Negative for depression, suicidal ideas and memory loss.  The patient is not nervous/anxious and does not have insomnia.    All other systems reviewed and are negative except as in HPI.    Exam:  /84   Pulse 72   Temp 37.2 °C (99 °F)   Resp 16   Ht 1.956 m (6' 5\")   Wt (!) 137.9 kg (304 lb)   SpO2 95%   General:  Well nourished, well developed male in NAD  Head: Grossly normal.  Neck: Supple without JVD or bruit. Thyroid is not enlarged.  Pulmonary: Clear to ausculation. Normal effort. No rales, ronchi, or wheezing.  Cardiovascular: Regular rate and rhythm without murmur.   Abdomen:  Soft, nontender, nondistended.  Extremities: No clubbing, cyanosis, or edema.  Skin: Intact with no obvious rashes or lesions.  Neuro: Grossly intact.  Psych: Alert and oriented x 3.  Mood and affect appropriate.    Medical decision-making and discussion: Lake is here today for follow-up.  Lab work was ordered and he will follow-up here as needed.          Assessment/Plan:  1. Hypogonadism male  TESTOSTERONE F&T MALE ADULT   2. Elevated LFTs  HEPATIC FUNCTION PANEL       Return if symptoms worsen or fail to improve.    Please note that this dictation was created using voice recognition software. I have made every reasonable attempt to correct obvious errors, but I expect that there are errors of grammar and possibly content that I did not discover before finalizing the note.  "

## 2019-08-01 LAB
SHBG SERPL-SCNC: 37 NMOL/L (ref 11–80)
TESTOST FREE MFR SERPL: 1.7 % (ref 1.6–2.9)
TESTOST FREE SERPL-MCNC: 41 PG/ML (ref 47–244)
TESTOST SERPL-MCNC: 245 NG/DL (ref 300–890)

## 2019-08-07 ENCOUNTER — OFFICE VISIT (OUTPATIENT)
Dept: URGENT CARE | Facility: PHYSICIAN GROUP | Age: 39
End: 2019-08-07
Payer: COMMERCIAL

## 2019-08-07 ENCOUNTER — APPOINTMENT (OUTPATIENT)
Dept: URGENT CARE | Facility: PHYSICIAN GROUP | Age: 39
End: 2019-08-07
Payer: COMMERCIAL

## 2019-08-07 ENCOUNTER — TELEPHONE (OUTPATIENT)
Dept: MEDICAL GROUP | Facility: PHYSICIAN GROUP | Age: 39
End: 2019-08-07

## 2019-08-07 VITALS
SYSTOLIC BLOOD PRESSURE: 126 MMHG | HEART RATE: 68 BPM | BODY MASS INDEX: 35.54 KG/M2 | RESPIRATION RATE: 18 BRPM | HEIGHT: 77 IN | OXYGEN SATURATION: 96 % | DIASTOLIC BLOOD PRESSURE: 74 MMHG | WEIGHT: 301 LBS | TEMPERATURE: 98.6 F

## 2019-08-07 DIAGNOSIS — H81.11 BENIGN PAROXYSMAL POSITIONAL VERTIGO OF RIGHT EAR: ICD-10-CM

## 2019-08-07 PROCEDURE — 99214 OFFICE O/P EST MOD 30 MIN: CPT | Performed by: PHYSICIAN ASSISTANT

## 2019-08-07 RX ORDER — MECLIZINE HYDROCHLORIDE 25 MG/1
25 TABLET ORAL 3 TIMES DAILY PRN
Qty: 30 TAB | Refills: 0 | Status: SHIPPED | OUTPATIENT
Start: 2019-08-07 | End: 2022-02-23

## 2019-08-07 RX ORDER — LORATADINE 10 MG/1
10 TABLET ORAL DAILY
COMMUNITY
End: 2022-02-23

## 2019-08-07 RX ORDER — ONDANSETRON 4 MG/1
4 TABLET, FILM COATED ORAL EVERY 8 HOURS PRN
Qty: 21 TAB | Refills: 0 | Status: SHIPPED | OUTPATIENT
Start: 2019-08-07 | End: 2019-08-14

## 2019-08-07 ASSESSMENT — ENCOUNTER SYMPTOMS
FATIGUE: 0
JOINT SWELLING: 0
VOMITING: 0
FEVER: 0
DIZZINESS: 1
NAUSEA: 1
HEADACHES: 0
COUGH: 0
MYALGIAS: 0
VISUAL CHANGE: 0
DIAPHORESIS: 0
WEAKNESS: 0

## 2019-08-07 NOTE — TELEPHONE ENCOUNTER
1. Caller Name: Miguel A Wife                                          Call Back Number: [phone]      Patient approves a detailed voicemail message: N\A    2. What are the patient's symptoms (location & severity)? Dizziness and nausea    3. Is this a new symptom Yes    4. When did it start? Today since 9AM    5. Action taken per Active Symptom Guide: Urgent Care recommended    6. Patient agrees to recommended action per Active Symptom Escalation Protocol.

## 2019-08-07 NOTE — TELEPHONE ENCOUNTER
VOICEMAIL  1. Caller Name: Pavan- Wife                      Call Back Number: [phone]    2. Message: Hes getting dizzy again really really bad. Can you give me a call?    3. Patient approves office to leave a detailed voicemail/MyChart message: N\A

## 2019-08-07 NOTE — LETTER
August 7, 2019         Patient: Lake Rossi   YOB: 1980   Date of Visit: 8/7/2019           To Whom it May Concern:    Lake Rossi was seen in my clinic on 8/7/2019.  Please excuse from work on 8/8/19 and 8/9/19.  If you have any questions or concerns, please don't hesitate to call.        Sincerely,           Sherri Stafford P.A.-C.  Electronically Signed

## 2019-08-08 NOTE — PROGRESS NOTES
"Subjective:      Phil Rossi is a 39 y.o. male who presents with Dizziness (started at 9 am sensation of room spinning)            Dizziness   This is a new problem. The problem occurs constantly. Progression since onset: positional. Associated symptoms include nausea. Pertinent negatives include no congestion, coughing, diaphoresis, fatigue, fever, headaches, joint swelling, myalgias, visual change, vomiting or weakness. Exacerbated by: change in head position. He has tried nothing for the symptoms. The treatment provided no relief.       Past medical history: Is not pertinent to this examination  Past surgical history: Not pertinent to this examination  Family history: Is not pertinent to this examination  Allergies: No known drug allergies  Social history: Is reviewed in [facility]  Current Outpatient Medications on File Prior to Visit   Medication Sig Dispense Refill   • loratadine (CLARITIN) 10 MG Tab Take 10 mg by mouth every day.       No current facility-administered medications on file prior to visit.          Review of Systems   Constitutional: Negative for diaphoresis, fatigue and fever.   HENT: Negative for congestion.    Respiratory: Negative for cough.    Gastrointestinal: Positive for nausea. Negative for vomiting.   Musculoskeletal: Negative for joint swelling and myalgias.   Neurological: Positive for dizziness. Negative for weakness and headaches.          Objective:     /74   Pulse 68   Temp 37 °C (98.6 °F)   Resp 18   Ht 1.956 m (6' 5\")   Wt (!) 136.5 kg (301 lb)   SpO2 96%   BMI 35.69 kg/m²      Physical Exam       Gen.: Patient is A and O ×3, no acute distress, well-nourished well-hydrated  Vitals: Are listed and unremarkable  HEENT: Heads normocephalic, atraumatic, PERRLA, extraocular movements intact, TMs and oropharynx clear  Neck: Soft supple without cervical lymphadenopathy  Cardiovascular: Regular rate and rhythm normal S1 and S2. No murmurs, rubs or gallops  Lungs are clear " to auscultation bilaterally. no wheezes rales or rhonchi  Abdomen is soft, nontender, nondistended with good bowel sounds, no hepatosplenomegaly  Skin: Is well perfused without evidence of rash or lesions  Neurological:  cranial nerves II through XII were assessed and intact.  Musculoskeletal: Full range of motion, 5 out of 5 strength against resistance  Neurovascularly: Intact with a 2 second cap refill, good distal pulses    Urgent CARE course: Maria Elena-Hallpike was done and was strongly positive on the right side     Assessment/Plan:     1. Benign paroxysmal positional vertigo of right ear  If symptoms worsen despite treatment please go to the emergency room for further evaluation  - meclizine (ANTIVERT) 25 MG Tab; Take 1 Tab by mouth 3 times a day as needed.  Dispense: 30 Tab; Refill: 0  - ondansetron (ZOFRAN) 4 MG Tab tablet; Take 1 Tab by mouth every 8 hours as needed for Nausea/Vomiting for up to 7 days.  Dispense: 21 Tab; Refill: 0

## 2021-03-31 ENCOUNTER — OFFICE VISIT (OUTPATIENT)
Dept: MEDICAL GROUP | Facility: MEDICAL CENTER | Age: 41
End: 2021-03-31
Payer: COMMERCIAL

## 2021-03-31 VITALS
HEART RATE: 63 BPM | BODY MASS INDEX: 35.84 KG/M2 | TEMPERATURE: 97.8 F | SYSTOLIC BLOOD PRESSURE: 132 MMHG | OXYGEN SATURATION: 97 % | HEIGHT: 77 IN | WEIGHT: 303.57 LBS | DIASTOLIC BLOOD PRESSURE: 70 MMHG

## 2021-03-31 DIAGNOSIS — F17.208 NICOTINE DEPENDENCE WITH OTHER NICOTINE-INDUCED DISORDER, UNSPECIFIED NICOTINE PRODUCT TYPE: ICD-10-CM

## 2021-03-31 DIAGNOSIS — E29.1 HYPOGONADISM MALE: ICD-10-CM

## 2021-03-31 DIAGNOSIS — Z91.89 OTHER SPECIFIED PERSONAL RISK FACTORS, NOT ELSEWHERE CLASSIFIED: ICD-10-CM

## 2021-03-31 DIAGNOSIS — Z76.89 ENCOUNTER TO ESTABLISH CARE: ICD-10-CM

## 2021-03-31 DIAGNOSIS — E78.5 DYSLIPIDEMIA: ICD-10-CM

## 2021-03-31 DIAGNOSIS — K80.20 CALCULUS OF GALLBLADDER WITHOUT CHOLECYSTITIS WITHOUT OBSTRUCTION: ICD-10-CM

## 2021-03-31 DIAGNOSIS — R10.11 COLICKY RUQ ABDOMINAL PAIN: ICD-10-CM

## 2021-03-31 DIAGNOSIS — E55.9 VITAMIN D DEFICIENCY: ICD-10-CM

## 2021-03-31 PROBLEM — R79.89 ELEVATED LFTS: Status: RESOLVED | Noted: 2019-07-30 | Resolved: 2021-03-31

## 2021-03-31 PROBLEM — L98.9 SKIN LESION: Status: RESOLVED | Noted: 2018-10-08 | Resolved: 2021-03-31

## 2021-03-31 PROCEDURE — 99213 OFFICE O/P EST LOW 20 MIN: CPT | Performed by: NURSE PRACTITIONER

## 2021-03-31 RX ORDER — FLUOCINONIDE TOPICAL SOLUTION USP, 0.05% 0.5 MG/ML
SOLUTION TOPICAL
COMMUNITY
Start: 2021-03-01 | End: 2022-02-23

## 2021-03-31 RX ORDER — TRIAMCINOLONE ACETONIDE 1 MG/G
OINTMENT TOPICAL
COMMUNITY
Start: 2021-03-01 | End: 2022-02-23

## 2021-03-31 ASSESSMENT — PATIENT HEALTH QUESTIONNAIRE - PHQ9: CLINICAL INTERPRETATION OF PHQ2 SCORE: 0

## 2021-03-31 NOTE — PROGRESS NOTES
"Chief Complaint   Patient presents with   • Establish Care     Joselito Sky   • Orders Needed     Testosterone lvls, general lab work          Slim Rossi is a 41 y.o. male here to establish care and to discuss the evaluation and management of:      Former PCP: Joselito Sky      1. Hypogonadism male  Historically present.  Has not followed up with urology for any treatment options regarding this.  He is interested in testosterone treatment.  Complains of gynecomastia decreased sexual function and libido.    2. Dyslipidemia  Historically present.  Not on statin therapy.  On keto diet.    3. Colicky RUQ abdominal pain  Complains of having intermittent right upper quadrant colicky pain.  States it is worse when he is eating \" garbage.\"     4. Calculus of gallbladder without cholecystitis without obstruction  Historically present-ultrasound from 2017.    5. Vitamin D deficiency  Previously suboptimal.    6. Body mass index (BMI) of 36.0 to 36.9  States he used to be almost 400 pounds.  Has been doing the keto carnivore diet.    7. Nicotine dependence with other nicotine-induced disorder, unspecified nicotine product type  Currently using a 3 mg nicotine patch.        Of note has a very small area on the left lateral foot that is dried and peeling.  No erythema.  Has tried some steroid creams for this.  Will address if continues to be bothersome.      ROS:  Denies any Headache, Blurred Vision, Confusion, Chest pain,  Shortness of breath,  Abdominal pain, Changes of bowel or bladder, Hematuria, Hematochezia, Lower ext. edema, Fevers, Nights sweats, Weight Changes, Focal weakness or numbness.  And all other systems reviewed and all are negative. Positive for : See above      Current Outpatient Medications:   •  triamcinolone acetonide (KENALOG) 0.1 % Ointment, , Disp: , Rfl:   •  fluocinonide (LIDEX) 0.05 % external solution, , Disp: , Rfl:   •  loratadine (CLARITIN) 10 MG Tab, Take 10 mg by mouth every day., " Disp: , Rfl:   •  meclizine (ANTIVERT) 25 MG Tab, Take 1 Tab by mouth 3 times a day as needed., Disp: 30 Tab, Rfl: 0    No Known Allergies    Past Medical History:   Diagnosis Date   • GERD (gastroesophageal reflux disease)    • PTSD (post-traumatic stress disorder)      History reviewed. No pertinent surgical history.  Family History   Problem Relation Age of Onset   • Cancer Mother 42        ovarian     Social History     Socioeconomic History   • Marital status:      Spouse name: Not on file   • Number of children: Not on file   • Years of education: Not on file   • Highest education level: Not on file   Occupational History   • Not on file   Tobacco Use   • Smoking status: Former Smoker     Packs/day: 0.50   • Smokeless tobacco: Never Used   • Tobacco comment: Uses Vape. Nicotine poutch   Substance and Sexual Activity   • Alcohol use: No     Alcohol/week: 0.0 oz     Comment: alcoholism October 18, 2014 recovering addict   • Drug use: No     Types: Inhaled, Injected (Skin Popping), Intravenous     Comment: Heroin last used 2014   • Sexual activity: Yes     Partners: Female   Other Topics Concern   • Not on file   Social History Narrative   • Not on file     Social Determinants of Health     Financial Resource Strain:    • Difficulty of Paying Living Expenses:    Food Insecurity:    • Worried About Running Out of Food in the Last Year:    • Ran Out of Food in the Last Year:    Transportation Needs:    • Lack of Transportation (Medical):    • Lack of Transportation (Non-Medical):    Physical Activity:    • Days of Exercise per Week:    • Minutes of Exercise per Session:    Stress:    • Feeling of Stress :    Social Connections:    • Frequency of Communication with Friends and Family:    • Frequency of Social Gatherings with Friends and Family:    • Attends Taoist Services:    • Active Member of Clubs or Organizations:    • Attends Club or Organization Meetings:    • Marital Status:    Intimate Partner  "Violence:    • Fear of Current or Ex-Partner:    • Emotionally Abused:    • Physically Abused:    • Sexually Abused:        Objective:     Vitals: /70 (BP Location: Right arm, Patient Position: Sitting, BP Cuff Size: Adult)   Pulse 63   Temp 36.6 °C (97.8 °F) (Temporal)   Ht 1.956 m (6' 5\")   Wt (!) 138 kg (303 lb 9.2 oz)   SpO2 97%   BMI 36.00 kg/m²      General: Alert, pleasant, NAD  HEENT:  Normocephalic.  Neck supple.  No thyromegaly or masses palpated. No cervical or supraclavicular lymphadenopathy.  Heart:  Regular rate and rhythm.  S1 and S2 normal.  No murmurs appreciated.    Respiratory:  Normal respiratory effort.  Clear to auscultation bilaterally.    Skin:  Warm, dry, no rashes  Musculoskeletal:  Gait is normal.  Moves all extremities well.  Extremities:   No leg edema.  Neurological: No tremors  Psych:  Affect/mood is normal, judgement is good, memory is intact, grooming is appropriate.      Assessment and Plan.     41 y.o. male to establish care and discuss the followin. Hypogonadism male  Historically present, symptomatic.  Referral placed to urology.  - TESTOSTERONE, FREE AND TOTAL; Future  - REFERRAL TO UROLOGY  - TSH WITH REFLEX TO FT4; Future  - MICROALBUMIN CREAT RATIO URINE; Future    2. Dyslipidemia  Repeat lipid panel.  - Lipid Profile; Future    3. Colicky RUQ abdominal pain  Follow-up ultrasound.  - US-RUQ; Future  - CBC WITH DIFFERENTIAL; Future    4. Calculus of gallbladder without cholecystitis without obstruction    5. Vitamin D deficiency  - VITAMIN D,25 HYDROXY; Future    6. Body mass index (BMI) of 36.0 to 36.9  Chronic. Patient encouraged to reduce excess calorie consumption. Encouraged regular exercise. Discussed long term sequelae of obesity.  - HEMOGLOBIN A1C; Future    7. Nicotine dependence with other nicotine-induced disorder, unspecified nicotine product type  Recommend cessation.    8. Other specified personal risk factors, not elsewhere classified  Have " ordered cardiac calcium score for risk stratification.  - CT-CARDIAC SCORING; Future    9. Encounter to establish care    Other orders  - triamcinolone acetonide (KENALOG) 0.1 % Ointment  - fluocinonide (LIDEX) 0.05 % external solution        No follow-ups on file.        Dilcia HAYES.

## 2021-04-05 ENCOUNTER — HOSPITAL ENCOUNTER (OUTPATIENT)
Dept: LAB | Facility: MEDICAL CENTER | Age: 41
End: 2021-04-05
Attending: NURSE PRACTITIONER
Payer: COMMERCIAL

## 2021-04-05 DIAGNOSIS — E29.1 HYPOGONADISM MALE: ICD-10-CM

## 2021-04-05 DIAGNOSIS — E78.5 DYSLIPIDEMIA: ICD-10-CM

## 2021-04-05 DIAGNOSIS — E55.9 VITAMIN D DEFICIENCY: ICD-10-CM

## 2021-04-05 DIAGNOSIS — R10.11 COLICKY RUQ ABDOMINAL PAIN: ICD-10-CM

## 2021-04-05 PROBLEM — K80.20 CALCULUS OF GALLBLADDER WITHOUT CHOLECYSTITIS WITHOUT OBSTRUCTION: Status: ACTIVE | Noted: 2021-04-05

## 2021-04-05 LAB
BASOPHILS # BLD AUTO: 0.7 % (ref 0–1.8)
BASOPHILS # BLD: 0.06 K/UL (ref 0–0.12)
CHOLEST SERPL-MCNC: 167 MG/DL (ref 100–199)
CREAT UR-MCNC: 12.25 MG/DL
EOSINOPHIL # BLD AUTO: 0.33 K/UL (ref 0–0.51)
EOSINOPHIL NFR BLD: 3.7 % (ref 0–6.9)
ERYTHROCYTE [DISTWIDTH] IN BLOOD BY AUTOMATED COUNT: 40 FL (ref 35.9–50)
FASTING STATUS PATIENT QL REPORTED: NORMAL
HCT VFR BLD AUTO: 49.1 % (ref 42–52)
HDLC SERPL-MCNC: 42 MG/DL
HGB BLD-MCNC: 16.2 G/DL (ref 14–18)
IMM GRANULOCYTES # BLD AUTO: 0.04 K/UL (ref 0–0.11)
IMM GRANULOCYTES NFR BLD AUTO: 0.4 % (ref 0–0.9)
LDLC SERPL CALC-MCNC: 111 MG/DL
LYMPHOCYTES # BLD AUTO: 2.45 K/UL (ref 1–4.8)
LYMPHOCYTES NFR BLD: 27.6 % (ref 22–41)
MCH RBC QN AUTO: 29 PG (ref 27–33)
MCHC RBC AUTO-ENTMCNC: 33 G/DL (ref 33.7–35.3)
MCV RBC AUTO: 87.8 FL (ref 81.4–97.8)
MICROALBUMIN UR-MCNC: <1.2 MG/DL
MICROALBUMIN/CREAT UR: NORMAL MG/G (ref 0–30)
MONOCYTES # BLD AUTO: 0.85 K/UL (ref 0–0.85)
MONOCYTES NFR BLD AUTO: 9.6 % (ref 0–13.4)
NEUTROPHILS # BLD AUTO: 5.16 K/UL (ref 1.82–7.42)
NEUTROPHILS NFR BLD: 58 % (ref 44–72)
NRBC # BLD AUTO: 0 K/UL
NRBC BLD-RTO: 0 /100 WBC
PLATELET # BLD AUTO: 225 K/UL (ref 164–446)
PMV BLD AUTO: 8.8 FL (ref 9–12.9)
RBC # BLD AUTO: 5.59 M/UL (ref 4.7–6.1)
TRIGL SERPL-MCNC: 69 MG/DL (ref 0–149)
TSH SERPL DL<=0.005 MIU/L-ACNC: 0.7 UIU/ML (ref 0.38–5.33)
WBC # BLD AUTO: 8.9 K/UL (ref 4.8–10.8)

## 2021-04-05 PROCEDURE — 84443 ASSAY THYROID STIM HORMONE: CPT

## 2021-04-05 PROCEDURE — 84402 ASSAY OF FREE TESTOSTERONE: CPT

## 2021-04-05 PROCEDURE — 85025 COMPLETE CBC W/AUTO DIFF WBC: CPT

## 2021-04-05 PROCEDURE — 36415 COLL VENOUS BLD VENIPUNCTURE: CPT

## 2021-04-05 PROCEDURE — 84270 ASSAY OF SEX HORMONE GLOBUL: CPT

## 2021-04-05 PROCEDURE — 82570 ASSAY OF URINE CREATININE: CPT

## 2021-04-05 PROCEDURE — 82043 UR ALBUMIN QUANTITATIVE: CPT

## 2021-04-05 PROCEDURE — 80061 LIPID PANEL: CPT

## 2021-04-05 PROCEDURE — 83036 HEMOGLOBIN GLYCOSYLATED A1C: CPT

## 2021-04-05 PROCEDURE — 82306 VITAMIN D 25 HYDROXY: CPT

## 2021-04-05 PROCEDURE — 84403 ASSAY OF TOTAL TESTOSTERONE: CPT

## 2021-04-06 LAB
EST. AVERAGE GLUCOSE BLD GHB EST-MCNC: 120 MG/DL
HBA1C MFR BLD: 5.8 % (ref 4–5.6)

## 2021-04-07 LAB
25(OH)D3 SERPL-MCNC: 44 NG/ML (ref 30–80)
SHBG SERPL-SCNC: 29 NMOL/L (ref 11–80)
TESTOST FREE MFR SERPL: 1.9 % (ref 1.6–2.9)
TESTOST FREE SERPL-MCNC: 58 PG/ML (ref 47–244)
TESTOST SERPL-MCNC: 301 NG/DL (ref 300–890)

## 2021-04-15 ENCOUNTER — HOSPITAL ENCOUNTER (OUTPATIENT)
Dept: RADIOLOGY | Facility: MEDICAL CENTER | Age: 41
End: 2021-04-15
Attending: NURSE PRACTITIONER
Payer: COMMERCIAL

## 2021-04-15 DIAGNOSIS — R10.11 COLICKY RUQ ABDOMINAL PAIN: ICD-10-CM

## 2021-04-15 PROCEDURE — 76705 ECHO EXAM OF ABDOMEN: CPT

## 2021-04-18 ENCOUNTER — HOSPITAL ENCOUNTER (OUTPATIENT)
Dept: RADIOLOGY | Facility: MEDICAL CENTER | Age: 41
End: 2021-04-18
Attending: NURSE PRACTITIONER
Payer: COMMERCIAL

## 2021-04-18 DIAGNOSIS — Z91.89 OTHER SPECIFIED PERSONAL RISK FACTORS, NOT ELSEWHERE CLASSIFIED: ICD-10-CM

## 2021-04-18 PROCEDURE — 4410556 CT-CARDIAC SCORING (SELF PAY ONLY)

## 2021-04-26 ENCOUNTER — OFFICE VISIT (OUTPATIENT)
Dept: MEDICAL GROUP | Facility: MEDICAL CENTER | Age: 41
End: 2021-04-26
Payer: COMMERCIAL

## 2021-04-26 VITALS
HEART RATE: 72 BPM | DIASTOLIC BLOOD PRESSURE: 76 MMHG | WEIGHT: 308 LBS | OXYGEN SATURATION: 95 % | BODY MASS INDEX: 36.37 KG/M2 | TEMPERATURE: 96.9 F | HEIGHT: 77 IN | SYSTOLIC BLOOD PRESSURE: 126 MMHG

## 2021-04-26 DIAGNOSIS — F17.208 NICOTINE DEPENDENCE WITH OTHER NICOTINE-INDUCED DISORDER, UNSPECIFIED NICOTINE PRODUCT TYPE: ICD-10-CM

## 2021-04-26 DIAGNOSIS — E78.5 DYSLIPIDEMIA: Chronic | ICD-10-CM

## 2021-04-26 DIAGNOSIS — I25.10 CORONARY ARTERY DISEASE INVOLVING NATIVE CORONARY ARTERY OF NATIVE HEART WITHOUT ANGINA PECTORIS: ICD-10-CM

## 2021-04-26 DIAGNOSIS — R73.01 IFG (IMPAIRED FASTING GLUCOSE): ICD-10-CM

## 2021-04-26 DIAGNOSIS — E29.1 HYPOGONADISM MALE: Chronic | ICD-10-CM

## 2021-04-26 PROCEDURE — 99213 OFFICE O/P EST LOW 20 MIN: CPT | Performed by: NURSE PRACTITIONER

## 2021-04-26 RX ORDER — VARENICLINE TARTRATE 1 MG/1
1 TABLET, FILM COATED ORAL 2 TIMES DAILY
Qty: 60 TABLET | Refills: 3 | Status: SHIPPED | OUTPATIENT
Start: 2021-04-26 | End: 2022-02-23

## 2021-04-26 ASSESSMENT — FIBROSIS 4 INDEX: FIB4 SCORE: 0.96

## 2021-04-26 NOTE — PROGRESS NOTES
Chief Complaint   Patient presents with   • Lab Results     DOS: 4/5/2021       Subjective:     HPI:     Slmi Rossi is a 41 y.o. male here to discuss the evaluation and management of:    Here with his wife today.     1. Coronary artery disease involving native coronary artery of native heart without angina pectoris  Have reviewed CAC score was 28.9. Have reviewed this and recommendations.     2. Dyslipidemia  The 10-year ASCVD risk score (Arsenio COHN JrArias, et al., 2013) is: 4.1%    Values used to calculate the score:      Age: 41 years      Sex: Male      Is Non- : No      Diabetic: No      Tobacco smoker: Yes      Systolic Blood Pressure: 126 mmHg      Is BP treated: No      HDL Cholesterol: 42 mg/dL      Total Cholesterol: 167 mg/dL     Ref. Range 4/5/2021 08:45   Cholesterol,Tot Latest Ref Range: 100 - 199 mg/dL 167   Triglycerides Latest Ref Range: 0 - 149 mg/dL 69   HDL Latest Ref Range: >=40 mg/dL 42   LDL Latest Ref Range: <100 mg/dL 111 (H)       3. IFG (impaired fasting glucose)  A1C was 5.8% on last labs.    4. Nicotine dependence with other nicotine-induced disorder, unspecified nicotine product type  Quit vaping last week.     5. Hypogonadism male  Has scheduled appt with Urology in May.           ROS:  Denies any Headache, Blurred Vision, Confusion, Chest pain,  Shortness of breath,  Abdominal pain, Changes of bowel or bladder, Lower ext edema, Fevers, Nights sweats, Weight Changes, Focal weakness or numbness.  And all other systems reviewed and are all negative. POSITIVE FOR see above        Current Outpatient Medications:   •  varenicline (CHANTIX STARTING MONTH MARYSOL) 0.5 MG X 11 & 1 MG X 42 tablet, Follow package instructions. Then complete continuation pack., Disp: 56 Each, Rfl: 3  •  varenicline (CHANTIX CONTINUING MONTH PAK) 1 MG tablet, Take 1 tablet by mouth 2 times a day., Disp: 60 tablet, Rfl: 3  •  triamcinolone acetonide (KENALOG) 0.1 % Ointment, , Disp: , Rfl:   •   fluocinonide (LIDEX) 0.05 % external solution, , Disp: , Rfl:   •  loratadine (CLARITIN) 10 MG Tab, Take 10 mg by mouth every day., Disp: , Rfl:   •  meclizine (ANTIVERT) 25 MG Tab, Take 1 Tab by mouth 3 times a day as needed., Disp: 30 Tab, Rfl: 0    No Known Allergies    Past Medical History:   Diagnosis Date   • GERD (gastroesophageal reflux disease)    • PTSD (post-traumatic stress disorder)      History reviewed. No pertinent surgical history.  Family History   Problem Relation Age of Onset   • Cancer Mother 42        ovarian     Social History     Socioeconomic History   • Marital status:      Spouse name: Not on file   • Number of children: Not on file   • Years of education: Not on file   • Highest education level: Not on file   Occupational History   • Not on file   Tobacco Use   • Smoking status: Current Every Day Smoker     Packs/day: 0.10     Types: Cigarettes   • Smokeless tobacco: Never Used   • Tobacco comment: 2 cigs   Substance and Sexual Activity   • Alcohol use: No     Alcohol/week: 0.0 oz     Comment: alcoholism October 18, 2014 recovering addict   • Drug use: No     Types: Inhaled, Injected (Skin Popping), Intravenous     Comment: Heroin last used 2014   • Sexual activity: Yes     Partners: Female   Other Topics Concern   • Not on file   Social History Narrative   • Not on file     Social Determinants of Health     Financial Resource Strain:    • Difficulty of Paying Living Expenses:    Food Insecurity:    • Worried About Running Out of Food in the Last Year:    • Ran Out of Food in the Last Year:    Transportation Needs:    • Lack of Transportation (Medical):    • Lack of Transportation (Non-Medical):    Physical Activity:    • Days of Exercise per Week:    • Minutes of Exercise per Session:    Stress:    • Feeling of Stress :    Social Connections:    • Frequency of Communication with Friends and Family:    • Frequency of Social Gatherings with Friends and Family:    • Attends  "Worship Services:    • Active Member of Clubs or Organizations:    • Attends Club or Organization Meetings:    • Marital Status:    Intimate Partner Violence:    • Fear of Current or Ex-Partner:    • Emotionally Abused:    • Physically Abused:    • Sexually Abused:        Objective:     Vitals: /76 (BP Location: Right arm, Patient Position: Sitting, BP Cuff Size: Adult)   Pulse 72   Temp 36.1 °C (96.9 °F) (Temporal)   Ht 1.956 m (6' 5\")   Wt (!) 140 kg (308 lb)   SpO2 95%   BMI 36.52 kg/m²    General: Alert, pleasant, NAD  HEENT: Normocephalic.   Skin: Warm, dry, no rashes.  Extremities: No leg edema. No discoloration  Neurological: No tremors  Psych:  Affect/mood is normal, judgement is good, memory is intact, grooming is appropriate.    Assessment/Plan:     Slim was seen today for lab results.    Diagnoses and all orders for this visit:    Coronary artery disease involving native coronary artery of native heart without angina pectoris  Mild CAC score at 28.9. ASCVD <5%. Hold on statin therapy at this time.   Recommend heart healthy diet. Start on Chantix.    Dyslipidemia  The 10-year ASCVD risk score (Redding DC Jr., et al., 2013) is: 4.1%Recheck lipid panel in 4-6 months. Continue to monitor. Hold on statin therapy at this time.   -     Lipid Profile; Future    IFG (impaired fasting glucose)  A1c is 5.8%. Recommend heart healthy diet, regular exercise.   -     HEMOGLOBIN A1C; Future    Nicotine dependence with other nicotine-induced disorder, unspecified nicotine product type  Will start on Chantix. Have discussed medication implications and side effects.   -     varenicline (CHANTIX STARTING MONTH PAK) 0.5 MG X 11 & 1 MG X 42 tablet; Follow package instructions. Then complete continuation pack.  -     varenicline (CHANTIX CONTINUING MONTH PAK) 1 MG tablet; Take 1 tablet by mouth 2 times a day.    Hypogonadism male  Has follow up with Urology in May.        No follow-ups on file.          Dilcia" Talia NARAYANAN

## 2021-04-29 ENCOUNTER — TELEPHONE (OUTPATIENT)
Dept: MEDICAL GROUP | Facility: MEDICAL CENTER | Age: 41
End: 2021-04-29

## 2021-04-29 NOTE — TELEPHONE ENCOUNTER
DOCUMENTATION OF PAR STATUS:    1. Name of Medication & Dose: varenicline (CHANTIX STARTING MONTH MARYSOL) 0.5 MG X 11 & 1 MG X 42 tablet      2. Name of Prescription Coverage Company & phone #: [employer]    3. Date Prior Auth Submitted: 4/29/2021    4. What information was given to obtain insurance decision? [email or web address]    5. Prior Auth Status? Pending    6. Patient Notified: N\A

## 2021-05-03 NOTE — TELEPHONE ENCOUNTER
FINAL PRIOR AUTHORIZATION STATUS:    1.  Name of Medication & Dose: Chantix     2. Prior Auth Status: Denied.  Reason: Pt has to try and fail bupropion    3. Action Taken: Pharmacy Notified: yes Patient Notified: no

## 2021-05-05 DIAGNOSIS — F17.208 NICOTINE DEPENDENCE WITH OTHER NICOTINE-INDUCED DISORDER, UNSPECIFIED NICOTINE PRODUCT TYPE: ICD-10-CM

## 2021-05-06 NOTE — TELEPHONE ENCOUNTER
Patient states that he was told by his insurance that if we sent for the generic they would pay for it.  Should have been written as generic. Can you follow up?

## 2021-05-07 DIAGNOSIS — F17.208 NICOTINE DEPENDENCE WITH OTHER NICOTINE-INDUCED DISORDER, UNSPECIFIED NICOTINE PRODUCT TYPE: ICD-10-CM

## 2021-05-07 RX ORDER — BUPROPION HYDROCHLORIDE 150 MG/1
TABLET, EXTENDED RELEASE ORAL
Qty: 60 TABLET | Refills: 2 | Status: SHIPPED | OUTPATIENT
Start: 2021-05-07 | End: 2022-03-29

## 2021-06-24 ENCOUNTER — HOSPITAL ENCOUNTER (OUTPATIENT)
Dept: LAB | Facility: MEDICAL CENTER | Age: 41
End: 2021-06-24
Attending: PHYSICIAN ASSISTANT
Payer: COMMERCIAL

## 2021-06-24 LAB
BASOPHILS # BLD AUTO: 0.5 % (ref 0–1.8)
BASOPHILS # BLD: 0.05 K/UL (ref 0–0.12)
EOSINOPHIL # BLD AUTO: 0.39 K/UL (ref 0–0.51)
EOSINOPHIL NFR BLD: 3.5 % (ref 0–6.9)
ERYTHROCYTE [DISTWIDTH] IN BLOOD BY AUTOMATED COUNT: 40.4 FL (ref 35.9–50)
HCT VFR BLD AUTO: 51.4 % (ref 42–52)
HGB BLD-MCNC: 17 G/DL (ref 14–18)
IMM GRANULOCYTES # BLD AUTO: 0.09 K/UL (ref 0–0.11)
IMM GRANULOCYTES NFR BLD AUTO: 0.8 % (ref 0–0.9)
LYMPHOCYTES # BLD AUTO: 2.87 K/UL (ref 1–4.8)
LYMPHOCYTES NFR BLD: 25.9 % (ref 22–41)
MCH RBC QN AUTO: 28.7 PG (ref 27–33)
MCHC RBC AUTO-ENTMCNC: 33.1 G/DL (ref 33.7–35.3)
MCV RBC AUTO: 86.8 FL (ref 81.4–97.8)
MONOCYTES # BLD AUTO: 1.04 K/UL (ref 0–0.85)
MONOCYTES NFR BLD AUTO: 9.4 % (ref 0–13.4)
NEUTROPHILS # BLD AUTO: 6.65 K/UL (ref 1.82–7.42)
NEUTROPHILS NFR BLD: 59.9 % (ref 44–72)
NRBC # BLD AUTO: 0 K/UL
NRBC BLD-RTO: 0 /100 WBC
PLATELET # BLD AUTO: 251 K/UL (ref 164–446)
PMV BLD AUTO: 8.8 FL (ref 9–12.9)
PROLACTIN SERPL-MCNC: 10.5 NG/ML (ref 2.1–17.7)
RBC # BLD AUTO: 5.92 M/UL (ref 4.7–6.1)
TESTOST SERPL-MCNC: 820 NG/DL (ref 175–781)
WBC # BLD AUTO: 11.1 K/UL (ref 4.8–10.8)

## 2021-06-24 PROCEDURE — 84154 ASSAY OF PSA FREE: CPT

## 2021-06-24 PROCEDURE — 84153 ASSAY OF PSA TOTAL: CPT

## 2021-06-24 PROCEDURE — 36415 COLL VENOUS BLD VENIPUNCTURE: CPT

## 2021-06-24 PROCEDURE — 84146 ASSAY OF PROLACTIN: CPT

## 2021-06-24 PROCEDURE — 85025 COMPLETE CBC W/AUTO DIFF WBC: CPT

## 2021-06-24 PROCEDURE — 84403 ASSAY OF TOTAL TESTOSTERONE: CPT

## 2021-06-27 LAB
PSA FREE MFR SERPL: 50 %
PSA FREE SERPL-MCNC: 0.1 NG/ML
PSA SERPL-MCNC: 0.2 NG/ML (ref 0–4)

## 2022-01-08 ENCOUNTER — HOSPITAL ENCOUNTER (OUTPATIENT)
Dept: LAB | Facility: MEDICAL CENTER | Age: 42
End: 2022-01-08
Attending: PHYSICIAN ASSISTANT
Payer: COMMERCIAL

## 2022-01-08 LAB
BASOPHILS # BLD AUTO: 0.8 % (ref 0–1.8)
BASOPHILS # BLD: 0.09 K/UL (ref 0–0.12)
EOSINOPHIL # BLD AUTO: 0.11 K/UL (ref 0–0.51)
EOSINOPHIL NFR BLD: 1 % (ref 0–6.9)
ERYTHROCYTE [DISTWIDTH] IN BLOOD BY AUTOMATED COUNT: 38.2 FL (ref 35.9–50)
ESTRADIOL SERPL-MCNC: 44.8 PG/ML
HCT VFR BLD AUTO: 41.2 % (ref 42–52)
HGB BLD-MCNC: 14.3 G/DL (ref 14–18)
IMM GRANULOCYTES # BLD AUTO: 0.05 K/UL (ref 0–0.11)
IMM GRANULOCYTES NFR BLD AUTO: 0.5 % (ref 0–0.9)
LYMPHOCYTES # BLD AUTO: 2.35 K/UL (ref 1–4.8)
LYMPHOCYTES NFR BLD: 22.1 % (ref 22–41)
MCH RBC QN AUTO: 29.9 PG (ref 27–33)
MCHC RBC AUTO-ENTMCNC: 34.7 G/DL (ref 33.7–35.3)
MCV RBC AUTO: 86.2 FL (ref 81.4–97.8)
MONOCYTES # BLD AUTO: 0.51 K/UL (ref 0–0.85)
MONOCYTES NFR BLD AUTO: 4.8 % (ref 0–13.4)
NEUTROPHILS # BLD AUTO: 7.54 K/UL (ref 1.82–7.42)
NEUTROPHILS NFR BLD: 70.8 % (ref 44–72)
NRBC # BLD AUTO: 0 K/UL
NRBC BLD-RTO: 0 /100 WBC
PLATELET # BLD AUTO: 286 K/UL (ref 164–446)
PMV BLD AUTO: 11 FL (ref 9–12.9)
RBC # BLD AUTO: 4.78 M/UL (ref 4.7–6.1)
TESTOST SERPL-MCNC: 14 NG/DL (ref 175–781)
WBC # BLD AUTO: 10.7 K/UL (ref 4.8–10.8)

## 2022-01-08 PROCEDURE — 36415 COLL VENOUS BLD VENIPUNCTURE: CPT

## 2022-01-08 PROCEDURE — 85025 COMPLETE CBC W/AUTO DIFF WBC: CPT

## 2022-01-08 PROCEDURE — 82670 ASSAY OF TOTAL ESTRADIOL: CPT

## 2022-01-08 PROCEDURE — 84403 ASSAY OF TOTAL TESTOSTERONE: CPT

## 2022-02-05 ENCOUNTER — HOSPITAL ENCOUNTER (OUTPATIENT)
Dept: LAB | Facility: MEDICAL CENTER | Age: 42
End: 2022-02-05
Attending: NURSE PRACTITIONER
Payer: COMMERCIAL

## 2022-02-05 DIAGNOSIS — E78.5 DYSLIPIDEMIA: Chronic | ICD-10-CM

## 2022-02-05 DIAGNOSIS — R73.01 IFG (IMPAIRED FASTING GLUCOSE): ICD-10-CM

## 2022-02-05 LAB
CHOLEST SERPL-MCNC: 236 MG/DL (ref 100–199)
EST. AVERAGE GLUCOSE BLD GHB EST-MCNC: 114 MG/DL
HBA1C MFR BLD: 5.6 % (ref 4–5.6)
HDLC SERPL-MCNC: 33 MG/DL
LDLC SERPL CALC-MCNC: 182 MG/DL
TRIGL SERPL-MCNC: 106 MG/DL (ref 0–149)

## 2022-02-05 PROCEDURE — 80061 LIPID PANEL: CPT

## 2022-02-05 PROCEDURE — 36415 COLL VENOUS BLD VENIPUNCTURE: CPT

## 2022-02-05 PROCEDURE — 83036 HEMOGLOBIN GLYCOSYLATED A1C: CPT

## 2022-02-23 ENCOUNTER — OFFICE VISIT (OUTPATIENT)
Dept: MEDICAL GROUP | Facility: MEDICAL CENTER | Age: 42
End: 2022-02-23
Payer: COMMERCIAL

## 2022-02-23 VITALS
HEART RATE: 79 BPM | TEMPERATURE: 98 F | WEIGHT: 308.2 LBS | BODY MASS INDEX: 36.39 KG/M2 | DIASTOLIC BLOOD PRESSURE: 78 MMHG | OXYGEN SATURATION: 98 % | SYSTOLIC BLOOD PRESSURE: 122 MMHG | HEIGHT: 77 IN

## 2022-02-23 DIAGNOSIS — K80.20 CALCULUS OF GALLBLADDER WITHOUT CHOLECYSTITIS WITHOUT OBSTRUCTION: ICD-10-CM

## 2022-02-23 DIAGNOSIS — F17.290 VAPING NICOTINE DEPENDENCE, TOBACCO PRODUCT: ICD-10-CM

## 2022-02-23 DIAGNOSIS — E78.5 DYSLIPIDEMIA: Chronic | ICD-10-CM

## 2022-02-23 DIAGNOSIS — I25.10 CORONARY ARTERY DISEASE INVOLVING NATIVE CORONARY ARTERY OF NATIVE HEART WITHOUT ANGINA PECTORIS: Chronic | ICD-10-CM

## 2022-02-23 DIAGNOSIS — R10.11 ABDOMINAL PAIN, RUQ: ICD-10-CM

## 2022-02-23 PROBLEM — Z87.891 HISTORY OF TOBACCO ABUSE: Status: ACTIVE | Noted: 2017-06-06

## 2022-02-23 PROBLEM — R68.82 REDUCED LIBIDO: Status: ACTIVE | Noted: 2021-05-06

## 2022-02-23 PROBLEM — K21.9 GASTROESOPHAGEAL REFLUX DISEASE: Chronic | Status: ACTIVE | Noted: 2018-10-08

## 2022-02-23 PROBLEM — F19.11 HISTORY OF SUBSTANCE ABUSE (HCC): Chronic | Status: ACTIVE | Noted: 2018-06-28

## 2022-02-23 PROBLEM — Z87.891 HISTORY OF TOBACCO ABUSE: Chronic | Status: ACTIVE | Noted: 2017-06-06

## 2022-02-23 PROCEDURE — 99213 OFFICE O/P EST LOW 20 MIN: CPT | Performed by: NURSE PRACTITIONER

## 2022-02-23 RX ORDER — TESTOSTERONE CYPIONATE 200 MG/ML
INJECTION, SOLUTION INTRAMUSCULAR
COMMUNITY

## 2022-02-23 RX ORDER — PAROXETINE 10 MG/1
TABLET, FILM COATED ORAL
COMMUNITY
End: 2022-03-29

## 2022-02-23 ASSESSMENT — PATIENT HEALTH QUESTIONNAIRE - PHQ9: CLINICAL INTERPRETATION OF PHQ2 SCORE: 0

## 2022-02-23 NOTE — PROGRESS NOTES
"Chief Complaint   Patient presents with   • Referral Needed     General Surgery         Subjective:     HPI:     Slim Rossi is a 41 y.o. male   here to discuss the evaluation and management of:     Patient presents today as he is ready to have a referral to general surgery for his gallstones.  He has been having intermittent right upper quadrant colicky pain for several years.  His symptoms are exacerbated by eating \"garbage food.\"  He states that it has progressively increased.  He does endorse poor food choices at times and will have episodes of pain and nausea.  This past weekend states was the worst where he was having vomiting.  He also tells me he has been taking some sort of gallbladder supplement for quite some time.    He has had 2 ultrasounds one in 2017 and one in 2021.    Right upper quadrant 4/2021   IMPRESSION:   1.  Cholelithiasis. No cholecystitis.  2.  Hepatomegaly. No hepatic mass lesions.    Dyslipidemia/CAD  Most recent lipid panel with an LDL of 182.  This is increased from previous levels.  He states he is not too worried about this as he does do a keto/carnivore diet.  Total cholesterol over HDL ratio is significantly elevated at 7.15.. History of CAC in April 2021 with a score of 28.9.    The 10-year ASCVD risk score (K[place] DC Jr., et al., 2013) is: 9.9%    Values used to calculate the score:      Age: 41 years      Sex: Male      Is Non- : No      Diabetic: No      Tobacco smoker: Yes      Systolic Blood Pressure: 122 mmHg      Is BP treated: No      HDL Cholesterol: 33 mg/dL      Total Cholesterol: 236 mg/dL      ROS:  Denies any Headache, Blurred Vision, Confusion, Chest pain,  Shortness of breath,  Abdominal pain, Changes of bowel or bladder, Lower ext edema, Fevers, Nights sweats, Weight Changes, Focal weakness or numbness.  And all other systems reviewed and are all negative. POSITIVE FOR : see above        Current Outpatient Medications:   •  " testosterone cypionate (DEPO-TESTOSTERONE) 200 MG/ML Solution injection, testosterone cypionate 200 mg/mL intramuscular oil, Disp: , Rfl:   •  PARoxetine (PAXIL) 10 MG Tab, paroxetine 10 mg tablet, Disp: , Rfl:   •  buPROPion SR (WELLBUTRIN SR) 150 MG TABLET SR 12 HR sustained-release tablet, Take 150 mg by mouth once daily for 3 days then increase to 150 mg twice daily, Disp: 60 tablet, Rfl: 2    No Known Allergies    Past Medical History:   Diagnosis Date   • GERD (gastroesophageal reflux disease)    • PTSD (post-traumatic stress disorder)      No past surgical history on file.  Family History   Problem Relation Age of Onset   • Cancer Mother 42        ovarian     Social History     Socioeconomic History   • Marital status:      Spouse name: Not on file   • Number of children: Not on file   • Years of education: Not on file   • Highest education level: Not on file   Occupational History   • Not on file   Tobacco Use   • Smoking status: Current Every Day Smoker     Packs/day: 0.10     Types: Cigarettes   • Smokeless tobacco: Never Used   • Tobacco comment: 2 cigs   Vaping Use   • Vaping Use: Former   Substance and Sexual Activity   • Alcohol use: No     Alcohol/week: 0.0 oz     Comment: alcoholism October 18, 2014 recovering addict   • Drug use: No     Types: Inhaled, Injected (Skin Popping), Intravenous     Comment: Heroin last used 2014   • Sexual activity: Yes     Partners: Female   Other Topics Concern   • Not on file   Social History Narrative   • Not on file     Social Determinants of Health     Financial Resource Strain: Not on file   Food Insecurity: Not on file   Transportation Needs: Not on file   Physical Activity: Not on file   Stress: Not on file   Social Connections: Not on file   Intimate Partner Violence: Not on file   Housing Stability: Not on file       Objective:     Vitals: /78 (BP Location: Right arm, Patient Position: Sitting, BP Cuff Size: Large adult)   Pulse 79   Temp 36.7 °C  "(98 °F) (Temporal)   Ht 1.956 m (6' 5\")   SpO2 98%   BMI 36.52 kg/m²    General: Alert, pleasant, NAD  HEENT: Normocephalic.  Neck supple.   Respiratory: no distress, no audible wheezing, RR -WNL  Skin: Warm, dry, no rashes.  Extremities: No leg edema. No discoloration  Neurological: No tremors  Psych:  Affect/mood is normal, judgement is good, memory is intact, grooming is appropriate.    Assessment/Plan:     Slim was seen today for referral needed.    Diagnoses and all orders for this visit:    Abdominal pain, RUQ  Progressing.  Patient is stable in the clinic.  Continue to strongly recommend low residue diet at this time.  ER precautions discussed.  Referral to general surgery.  -     Referral to General Surgery    Calculus of gallbladder without cholecystitis without obstruction  Known problem.  Patient is now ready for referral to general surgery for further evaluation.  -     Referral to General Surgery    Vaping nicotine dependence, tobacco product  Recommend cessation.    Dyslipidemia  Worsening.  Now LDL at 181.  Patient has quit smoking cigarettes however he is vaping tobacco.  He would like to eventually wean off of this as well.  Despite recommendation for starting on statin therapy he declines.  Elevated ASCVD at 9.9%.  Total cholesterol/HDL ratio is at 7.15.    Coronary artery disease involving native coronary artery of native heart without angina pectoris  Present on CAC.  Recommend statin therapy.  Patient declines.      No follow-ups on file.          Dilcia NARAYANAN"

## 2022-03-29 ENCOUNTER — PRE-ADMISSION TESTING (OUTPATIENT)
Dept: ADMISSIONS | Facility: MEDICAL CENTER | Age: 42
End: 2022-03-29
Attending: SURGERY
Payer: COMMERCIAL

## 2022-03-29 DIAGNOSIS — Z01.812 PRE-OPERATIVE LABORATORY EXAMINATION: ICD-10-CM

## 2022-03-29 LAB
ALBUMIN SERPL BCP-MCNC: 4.2 G/DL (ref 3.2–4.9)
ALBUMIN/GLOB SERPL: 1.3 G/DL
ALP SERPL-CCNC: 80 U/L (ref 30–99)
ALT SERPL-CCNC: 34 U/L (ref 2–50)
ANION GAP SERPL CALC-SCNC: 13 MMOL/L (ref 7–16)
AST SERPL-CCNC: 37 U/L (ref 12–45)
BILIRUB SERPL-MCNC: 0.3 MG/DL (ref 0.1–1.5)
BUN SERPL-MCNC: 20 MG/DL (ref 8–22)
CALCIUM SERPL-MCNC: 9.2 MG/DL (ref 8.4–10.2)
CHLORIDE SERPL-SCNC: 104 MMOL/L (ref 96–112)
CO2 SERPL-SCNC: 21 MMOL/L (ref 20–33)
CREAT SERPL-MCNC: 0.84 MG/DL (ref 0.5–1.4)
ERYTHROCYTE [DISTWIDTH] IN BLOOD BY AUTOMATED COUNT: 40.5 FL (ref 35.9–50)
GFR SERPLBLD CREATININE-BSD FMLA CKD-EPI: 110 ML/MIN/1.73 M 2
GLOBULIN SER CALC-MCNC: 3.3 G/DL (ref 1.9–3.5)
GLUCOSE SERPL-MCNC: 96 MG/DL (ref 65–99)
HCT VFR BLD AUTO: 51.6 % (ref 42–52)
HGB BLD-MCNC: 17.1 G/DL (ref 14–18)
MCH RBC QN AUTO: 28.2 PG (ref 27–33)
MCHC RBC AUTO-ENTMCNC: 33.1 G/DL (ref 33.7–35.3)
MCV RBC AUTO: 85.1 FL (ref 81.4–97.8)
PLATELET # BLD AUTO: 205 K/UL (ref 164–446)
PMV BLD AUTO: 8.7 FL (ref 9–12.9)
POTASSIUM SERPL-SCNC: 3.9 MMOL/L (ref 3.6–5.5)
PROT SERPL-MCNC: 7.5 G/DL (ref 6–8.2)
RBC # BLD AUTO: 6.06 M/UL (ref 4.7–6.1)
SARS-COV+SARS-COV-2 AG RESP QL IA.RAPID: NOTDETECTED
SODIUM SERPL-SCNC: 138 MMOL/L (ref 135–145)
SPECIMEN SOURCE: NORMAL
WBC # BLD AUTO: 10.9 K/UL (ref 4.8–10.8)

## 2022-03-29 PROCEDURE — 36415 COLL VENOUS BLD VENIPUNCTURE: CPT

## 2022-03-29 PROCEDURE — 87426 SARSCOV CORONAVIRUS AG IA: CPT

## 2022-03-29 PROCEDURE — 80053 COMPREHEN METABOLIC PANEL: CPT

## 2022-03-29 PROCEDURE — 85027 COMPLETE CBC AUTOMATED: CPT

## 2022-03-29 NOTE — OR NURSING
"Preadmit appointment: \" Preparing for your Procedure information\" sheet given to patient with verbal and written instructions. Patient instructed to continue prescribed medications through the day before surgery, instructed to take the following medications the day of surgery per anesthesia protocol:  NONE              "

## 2022-03-31 ENCOUNTER — ANESTHESIA (OUTPATIENT)
Dept: SURGERY | Facility: MEDICAL CENTER | Age: 42
End: 2022-03-31
Payer: COMMERCIAL

## 2022-03-31 ENCOUNTER — HOSPITAL ENCOUNTER (OUTPATIENT)
Facility: MEDICAL CENTER | Age: 42
End: 2022-03-31
Attending: SURGERY | Admitting: SURGERY
Payer: COMMERCIAL

## 2022-03-31 ENCOUNTER — ANESTHESIA EVENT (OUTPATIENT)
Dept: SURGERY | Facility: MEDICAL CENTER | Age: 42
End: 2022-03-31
Payer: COMMERCIAL

## 2022-03-31 VITALS
HEART RATE: 83 BPM | WEIGHT: 315 LBS | BODY MASS INDEX: 37.19 KG/M2 | HEIGHT: 77 IN | OXYGEN SATURATION: 92 % | DIASTOLIC BLOOD PRESSURE: 86 MMHG | SYSTOLIC BLOOD PRESSURE: 147 MMHG | TEMPERATURE: 98.2 F | RESPIRATION RATE: 16 BRPM

## 2022-03-31 DIAGNOSIS — G89.18 POSTOPERATIVE PAIN: ICD-10-CM

## 2022-03-31 LAB
INR PPP: 0.99 (ref 0.87–1.13)
PATHOLOGY CONSULT NOTE: NORMAL
PROTHROMBIN TIME: 12.3 SEC (ref 12–14.6)

## 2022-03-31 PROCEDURE — 160041 HCHG SURGERY MINUTES - EA ADDL 1 MIN LEVEL 4: Performed by: SURGERY

## 2022-03-31 PROCEDURE — 160035 HCHG PACU - 1ST 60 MINS PHASE I: Performed by: SURGERY

## 2022-03-31 PROCEDURE — 160002 HCHG RECOVERY MINUTES (STAT): Performed by: SURGERY

## 2022-03-31 PROCEDURE — 500800 HCHG LAPAROSCOPIC J/L HOOK: Performed by: SURGERY

## 2022-03-31 PROCEDURE — A9270 NON-COVERED ITEM OR SERVICE: HCPCS | Performed by: ANESTHESIOLOGY

## 2022-03-31 PROCEDURE — 160036 HCHG PACU - EA ADDL 30 MINS PHASE I: Performed by: SURGERY

## 2022-03-31 PROCEDURE — A9270 NON-COVERED ITEM OR SERVICE: HCPCS | Performed by: SURGERY

## 2022-03-31 PROCEDURE — 160046 HCHG PACU - 1ST 60 MINS PHASE II: Performed by: SURGERY

## 2022-03-31 PROCEDURE — 500514 HCHG ENDOCLIP: Performed by: SURGERY

## 2022-03-31 PROCEDURE — 700111 HCHG RX REV CODE 636 W/ 250 OVERRIDE (IP): Performed by: ANESTHESIOLOGY

## 2022-03-31 PROCEDURE — 700102 HCHG RX REV CODE 250 W/ 637 OVERRIDE(OP): Performed by: ANESTHESIOLOGY

## 2022-03-31 PROCEDURE — 160025 RECOVERY II MINUTES (STATS): Performed by: SURGERY

## 2022-03-31 PROCEDURE — 501838 HCHG SUTURE GENERAL: Performed by: SURGERY

## 2022-03-31 PROCEDURE — 160009 HCHG ANES TIME/MIN: Performed by: SURGERY

## 2022-03-31 PROCEDURE — 700102 HCHG RX REV CODE 250 W/ 637 OVERRIDE(OP): Performed by: SURGERY

## 2022-03-31 PROCEDURE — 88304 TISSUE EXAM BY PATHOLOGIST: CPT

## 2022-03-31 PROCEDURE — 85610 PROTHROMBIN TIME: CPT

## 2022-03-31 PROCEDURE — 700105 HCHG RX REV CODE 258: Performed by: SURGERY

## 2022-03-31 PROCEDURE — 501570 HCHG TROCAR, SEPARATOR: Performed by: SURGERY

## 2022-03-31 PROCEDURE — 700101 HCHG RX REV CODE 250: Performed by: SURGERY

## 2022-03-31 PROCEDURE — 160029 HCHG SURGERY MINUTES - 1ST 30 MINS LEVEL 4: Performed by: SURGERY

## 2022-03-31 PROCEDURE — 500179 HCHG CATH, CHOLANGIOGRAM: Performed by: SURGERY

## 2022-03-31 PROCEDURE — 160048 HCHG OR STATISTICAL LEVEL 1-5: Performed by: SURGERY

## 2022-03-31 PROCEDURE — 700101 HCHG RX REV CODE 250: Performed by: ANESTHESIOLOGY

## 2022-03-31 PROCEDURE — 500002 HCHG ADHESIVE, DERMABOND: Performed by: SURGERY

## 2022-03-31 RX ORDER — BUPIVACAINE HYDROCHLORIDE AND EPINEPHRINE 5; 5 MG/ML; UG/ML
INJECTION, SOLUTION EPIDURAL; INTRACAUDAL; PERINEURAL
Status: DISCONTINUED | OUTPATIENT
Start: 2022-03-31 | End: 2022-03-31 | Stop reason: HOSPADM

## 2022-03-31 RX ORDER — ROCURONIUM BROMIDE 10 MG/ML
INJECTION, SOLUTION INTRAVENOUS PRN
Status: DISCONTINUED | OUTPATIENT
Start: 2022-03-31 | End: 2022-03-31 | Stop reason: SURG

## 2022-03-31 RX ORDER — DIPHENHYDRAMINE HCL 25 MG
25 TABLET ORAL EVERY 6 HOURS PRN
Status: DISCONTINUED | OUTPATIENT
Start: 2022-03-31 | End: 2022-03-31 | Stop reason: HOSPADM

## 2022-03-31 RX ORDER — HALOPERIDOL 5 MG/ML
1 INJECTION INTRAMUSCULAR
Status: DISCONTINUED | OUTPATIENT
Start: 2022-03-31 | End: 2022-03-31 | Stop reason: HOSPADM

## 2022-03-31 RX ORDER — MIDAZOLAM HYDROCHLORIDE 1 MG/ML
INJECTION INTRAMUSCULAR; INTRAVENOUS PRN
Status: DISCONTINUED | OUTPATIENT
Start: 2022-03-31 | End: 2022-03-31 | Stop reason: SURG

## 2022-03-31 RX ORDER — HYDROMORPHONE HYDROCHLORIDE 1 MG/ML
0.4 INJECTION, SOLUTION INTRAMUSCULAR; INTRAVENOUS; SUBCUTANEOUS
Status: DISCONTINUED | OUTPATIENT
Start: 2022-03-31 | End: 2022-03-31 | Stop reason: HOSPADM

## 2022-03-31 RX ORDER — METOPROLOL TARTRATE 1 MG/ML
1 INJECTION, SOLUTION INTRAVENOUS
Status: DISCONTINUED | OUTPATIENT
Start: 2022-03-31 | End: 2022-03-31 | Stop reason: HOSPADM

## 2022-03-31 RX ORDER — DIPHENHYDRAMINE HYDROCHLORIDE 50 MG/ML
12.5 INJECTION INTRAMUSCULAR; INTRAVENOUS
Status: DISCONTINUED | OUTPATIENT
Start: 2022-03-31 | End: 2022-03-31 | Stop reason: HOSPADM

## 2022-03-31 RX ORDER — MEPERIDINE HYDROCHLORIDE 25 MG/ML
12.5 INJECTION INTRAMUSCULAR; INTRAVENOUS; SUBCUTANEOUS
Status: DISCONTINUED | OUTPATIENT
Start: 2022-03-31 | End: 2022-03-31 | Stop reason: HOSPADM

## 2022-03-31 RX ORDER — CEFAZOLIN SODIUM 1 G/3ML
INJECTION, POWDER, FOR SOLUTION INTRAMUSCULAR; INTRAVENOUS PRN
Status: DISCONTINUED | OUTPATIENT
Start: 2022-03-31 | End: 2022-03-31 | Stop reason: SURG

## 2022-03-31 RX ORDER — HYDRALAZINE HYDROCHLORIDE 20 MG/ML
5 INJECTION INTRAMUSCULAR; INTRAVENOUS
Status: DISCONTINUED | OUTPATIENT
Start: 2022-03-31 | End: 2022-03-31 | Stop reason: HOSPADM

## 2022-03-31 RX ORDER — HYDROMORPHONE HYDROCHLORIDE 1 MG/ML
0.2 INJECTION, SOLUTION INTRAMUSCULAR; INTRAVENOUS; SUBCUTANEOUS
Status: DISCONTINUED | OUTPATIENT
Start: 2022-03-31 | End: 2022-03-31 | Stop reason: HOSPADM

## 2022-03-31 RX ORDER — SODIUM CHLORIDE, SODIUM LACTATE, POTASSIUM CHLORIDE, CALCIUM CHLORIDE 600; 310; 30; 20 MG/100ML; MG/100ML; MG/100ML; MG/100ML
INJECTION, SOLUTION INTRAVENOUS CONTINUOUS
Status: DISCONTINUED | OUTPATIENT
Start: 2022-03-31 | End: 2022-03-31 | Stop reason: HOSPADM

## 2022-03-31 RX ORDER — HYDROMORPHONE HYDROCHLORIDE 1 MG/ML
0.1 INJECTION, SOLUTION INTRAMUSCULAR; INTRAVENOUS; SUBCUTANEOUS
Status: DISCONTINUED | OUTPATIENT
Start: 2022-03-31 | End: 2022-03-31 | Stop reason: HOSPADM

## 2022-03-31 RX ORDER — GLYCOPYRROLATE 0.2 MG/ML
INJECTION INTRAMUSCULAR; INTRAVENOUS PRN
Status: DISCONTINUED | OUTPATIENT
Start: 2022-03-31 | End: 2022-03-31 | Stop reason: SURG

## 2022-03-31 RX ORDER — POLYETHYLENE GLYCOL 3350 17 G/17G
17 POWDER, FOR SOLUTION ORAL DAILY
Qty: 20 EACH | Refills: 0 | Status: SHIPPED | OUTPATIENT
Start: 2022-03-31 | End: 2023-06-19

## 2022-03-31 RX ORDER — DEXAMETHASONE SODIUM PHOSPHATE 4 MG/ML
INJECTION, SOLUTION INTRA-ARTICULAR; INTRALESIONAL; INTRAMUSCULAR; INTRAVENOUS; SOFT TISSUE PRN
Status: DISCONTINUED | OUTPATIENT
Start: 2022-03-31 | End: 2022-03-31 | Stop reason: SURG

## 2022-03-31 RX ORDER — DIPHENHYDRAMINE HYDROCHLORIDE 50 MG/ML
25 INJECTION INTRAMUSCULAR; INTRAVENOUS EVERY 6 HOURS PRN
Status: DISCONTINUED | OUTPATIENT
Start: 2022-03-31 | End: 2022-03-31 | Stop reason: HOSPADM

## 2022-03-31 RX ORDER — LABETALOL HYDROCHLORIDE 5 MG/ML
5 INJECTION, SOLUTION INTRAVENOUS
Status: DISCONTINUED | OUTPATIENT
Start: 2022-03-31 | End: 2022-03-31 | Stop reason: HOSPADM

## 2022-03-31 RX ORDER — IBUPROFEN 600 MG/1
600 TABLET ORAL EVERY 6 HOURS
Qty: 45 TABLET | Refills: 0 | Status: SHIPPED | OUTPATIENT
Start: 2022-03-31 | End: 2023-06-19

## 2022-03-31 RX ORDER — OXYCODONE HYDROCHLORIDE AND ACETAMINOPHEN 5; 325 MG/1; MG/1
2 TABLET ORAL
Status: COMPLETED | OUTPATIENT
Start: 2022-03-31 | End: 2022-03-31

## 2022-03-31 RX ORDER — OXYCODONE HYDROCHLORIDE 5 MG/1
5 TABLET ORAL EVERY 4 HOURS PRN
Qty: 12 TABLET | Refills: 0 | Status: SHIPPED | OUTPATIENT
Start: 2022-03-31 | End: 2022-04-03

## 2022-03-31 RX ORDER — ONDANSETRON 2 MG/ML
INJECTION INTRAMUSCULAR; INTRAVENOUS PRN
Status: DISCONTINUED | OUTPATIENT
Start: 2022-03-31 | End: 2022-03-31 | Stop reason: SURG

## 2022-03-31 RX ORDER — OXYCODONE HYDROCHLORIDE AND ACETAMINOPHEN 5; 325 MG/1; MG/1
1 TABLET ORAL
Status: COMPLETED | OUTPATIENT
Start: 2022-03-31 | End: 2022-03-31

## 2022-03-31 RX ORDER — ACETAMINOPHEN 325 MG/1
650 TABLET ORAL EVERY 6 HOURS
Qty: 60 TABLET | Refills: 0 | Status: SHIPPED | OUTPATIENT
Start: 2022-03-31

## 2022-03-31 RX ADMIN — CEFAZOLIN 3 G: 330 INJECTION, POWDER, FOR SOLUTION INTRAMUSCULAR; INTRAVENOUS at 08:28

## 2022-03-31 RX ADMIN — GLYCOPYRROLATE 0.1 MG: 0.2 INJECTION INTRAMUSCULAR; INTRAVENOUS at 09:17

## 2022-03-31 RX ADMIN — GLYCOPYRROLATE 0.1 MG: 0.2 INJECTION INTRAMUSCULAR; INTRAVENOUS at 09:04

## 2022-03-31 RX ADMIN — DEXAMETHASONE SODIUM PHOSPHATE 8 MG: 4 INJECTION, SOLUTION INTRAMUSCULAR; INTRAVENOUS at 08:28

## 2022-03-31 RX ADMIN — FENTANYL CITRATE 25 MCG: 50 INJECTION, SOLUTION INTRAMUSCULAR; INTRAVENOUS at 09:58

## 2022-03-31 RX ADMIN — MIDAZOLAM HYDROCHLORIDE 2 MG: 1 INJECTION, SOLUTION INTRAMUSCULAR; INTRAVENOUS at 08:28

## 2022-03-31 RX ADMIN — SODIUM CHLORIDE, POTASSIUM CHLORIDE, SODIUM LACTATE AND CALCIUM CHLORIDE: 600; 310; 30; 20 INJECTION, SOLUTION INTRAVENOUS at 08:28

## 2022-03-31 RX ADMIN — OXYCODONE AND ACETAMINOPHEN 2 TABLET: 5; 325 TABLET ORAL at 09:50

## 2022-03-31 RX ADMIN — FENTANYL CITRATE 50 MCG: 50 INJECTION, SOLUTION INTRAMUSCULAR; INTRAVENOUS at 10:12

## 2022-03-31 RX ADMIN — SUGAMMADEX 300 MG: 100 INJECTION, SOLUTION INTRAVENOUS at 09:16

## 2022-03-31 RX ADMIN — ROCURONIUM BROMIDE 65 MG: 10 INJECTION, SOLUTION INTRAVENOUS at 08:38

## 2022-03-31 RX ADMIN — ONDANSETRON 4 MG: 2 INJECTION INTRAMUSCULAR; INTRAVENOUS at 09:14

## 2022-03-31 RX ADMIN — BENZOCAINE AND MENTHOL 1 LOZENGE: 15; 3.6 LOZENGE ORAL at 10:09

## 2022-03-31 RX ADMIN — GLYCOPYRROLATE 0.1 MG: 0.2 INJECTION INTRAMUSCULAR; INTRAVENOUS at 08:59

## 2022-03-31 RX ADMIN — ROCURONIUM BROMIDE 5 MG: 10 INJECTION, SOLUTION INTRAVENOUS at 08:34

## 2022-03-31 RX ADMIN — FENTANYL CITRATE 100 MCG: 50 INJECTION, SOLUTION INTRAMUSCULAR; INTRAVENOUS at 08:34

## 2022-03-31 RX ADMIN — FENTANYL CITRATE 25 MCG: 50 INJECTION, SOLUTION INTRAMUSCULAR; INTRAVENOUS at 09:34

## 2022-03-31 RX ADMIN — PROPOFOL 200 MG: 10 INJECTION, EMULSION INTRAVENOUS at 08:34

## 2022-03-31 RX ADMIN — FENTANYL CITRATE 50 MCG: 50 INJECTION, SOLUTION INTRAMUSCULAR; INTRAVENOUS at 08:41

## 2022-03-31 RX ADMIN — FENTANYL CITRATE 50 MCG: 50 INJECTION, SOLUTION INTRAMUSCULAR; INTRAVENOUS at 09:07

## 2022-03-31 ASSESSMENT — PAIN DESCRIPTION - PAIN TYPE
TYPE: SURGICAL PAIN
TYPE: SURGICAL PAIN

## 2022-03-31 ASSESSMENT — PAIN SCALES - GENERAL: PAIN_LEVEL: 4

## 2022-03-31 NOTE — ANESTHESIA PREPROCEDURE EVALUATION
Case: [id number] Date/Time: 03/31/22 0845    Procedure: CHOLECYSTECTOMY, LAPAROSCOPIC (Abdomen)    Anesthesia type: General    Pre-op diagnosis: CALCULUS OF GALLBLADDER WITHOUT CHOLECYTITIS WITHOUT OBSTRUCTION    Location: [facility] OR 01 / SURGERY [facility]    Surgeons: Cedric Farrell M.D.          Relevant Problems   NEURO   (positive) History of substance abuse (HCC)      CARDIAC   (positive) Coronary artery disease involving native coronary artery of native heart without angina pectoris      GI   (positive) Gastroesophageal reflux disease       Physical Exam    Airway   Mallampati: II  TM distance: >3 FB  Neck ROM: full       Cardiovascular - normal exam  Rhythm: regular  Rate: normal  (-) murmur     Dental - normal exam           Pulmonary - normal exam  Breath sounds clear to auscultation     Abdominal    Neurological - normal exam                 Anesthesia Plan    ASA 2       Plan - general       Airway plan will be ETT          Induction: intravenous    Postoperative Plan: Postoperative administration of opioids is intended.    Pertinent diagnostic labs and testing reviewed    Informed Consent:    Anesthetic plan and risks discussed with patient.    Use of blood products discussed with: patient whom consented to blood products.

## 2022-03-31 NOTE — OR NURSING
0936: Report from Genevieve ALFARO. Pt states pain is increasing, plan analgesia. Icepack applied over c/d/i abdominal surgical sites.  0940: No change.   0955: Pt's pain not improving plan further analgesia.  1010: Pt deep breathing and coughing, pain is increasing with these activities, plan analgesia.  1025: Pt states pain is improving and tolerable. No change in surgical site assessment.  1030: Meets criteria to transfer to Stage 2. Report called to Gracie ALFARO.

## 2022-03-31 NOTE — OR NURSING
0929: To PACU from OR via gurney, drowsy but awake and c/o surgical pain, respirations spontaneous and non-labored. Abdo soft, lap sites x 4 w/o dressings or drainage.  0936: report to Ally ALFARO

## 2022-03-31 NOTE — ANESTHESIA TIME REPORT
Anesthesia Start and Stop Event Times     Date Time Event    3/31/2022 0744 Ready for Procedure     0828 Anesthesia Start     0932 Anesthesia Stop        Responsible Staff  03/31/22    Name Role Begin End    Sabino Hare D.O. Anesth 0828 0932        Preop Diagnosis (Free Text):  Pre-op Diagnosis     CALCULUS OF GALLBLADDER WITHOUT CHOLECYTITIS WITHOUT OBSTRUCTION        Preop Diagnosis (Codes):    Premium Reason  Non-Premium    Comments:

## 2022-03-31 NOTE — OP REPORT
Operative Report    Date: 3/31/2022    Surgeon: Cedric Farrell M.D.     Assistant: DAQUAN Gaullpa    Pre-operative Diagnosis: Symptomatic Cholelithiasis    Post-operative Diagnosis: same    ASA Classification: II.    Procedure: Laparoscopic cholecystectomy    Indications: This is a 42 y.o. male who presented with symptoms of symptomatic cholelithiasis.      The indications for a surgical assistant in this surgery were indicated due to complexity of the procedure. Their role included aiding in incision, retraction, holding devices including camera for laparoscopic procedure, and closure of the wound.      Wound Classification: Class II, II, Clean Contaminated..    Findings: critical view of safety obtained    Procedure in detail: The patient was seen and examined in the preoperative holding area.  The risks benefits and alternatives of the procedure were discussed with the patient who wished to proceed with the procedure as described.  The patient was transferred to the operating room placed in supine position and all pressure points were properly padded.  General endotracheal anesthesia was induced and preoperative antibiotics were given per SCIP protocol.  Patient's abdomen was prepped with ChloraPrep and draped in the normal sterile fashion.  A timeout was performed confirming correct patient, correct procedure, and that all necessary equipment was in the room.      After infiltration of local anesthetic, an incision was made in the supraumbilical position.  A combination of blunt and electrocautery dissection was used down to the fascia.  The umbilical stalk was grasped elevated and the fascia was sharply incised.  A figure-of-eight 0 vicryl suture was placed across the fascial opening.  The Brown port was introduced and pneumoperitoneum was achieved and maintained at 15 mmHg carbon dioxide throughout the entirety of the case.   The 5 mm camera was introduced and the abdomen was inspected and no  underlying trauma was identified from abdominal entry. After infusing local anesthetic under direct visualization two 5 mm right upper quadrant and one 5 mm epigastric port were placed.    The gallbladder was identified in the right upper quadrant.  A combination of blunt and electrocautery dissection were used to dissect the overlaying tissue free from around the cystic duct and cystic artery.  Dissection was continued until the cystic duct and cystic artery free and there is nothing but liver parenchyma behind.  This confirmed the critical view of safety. The cystic duct and cystic artery were double clipped on the patient's side single clipped on the specimen side and sharply transected. The gallbladder was removed from the cystic plate using electrocautery.  I inspected the cystic plate and hemostasis was obtained. The gallbladder was placed in a 10 mm Endo Catch bag through the supraumbilical port and removed.    All ports were removed with video assist, and were hemostatic. The previously placed vicryl suture were tied. All skin sites were closed with subcuticular Monocryl and Dermabond was placed over the wounds.    The patient was awakened from general anesthetic, and was taken to the recovery room in stable condition.    Sponge and needle counts were correct at the end of the case.     Specimen: gallbladder and content for permanent pathology    EBL: 10mL    Dispo: stable, extubated, to PACU    Cedric Farrell M.D.  [facility]  [phone]

## 2022-03-31 NOTE — DISCHARGE INSTRUCTIONS
Laparoscopic Cholecystectomy D/C instructions:    DIET: Upon discharge from the hospital you may resume your normal preoperative diet. Depending on how you are feeling and whether you have nausea or not, you may wish to stay with a bland diet for the first few days. However, you can advance this as quickly as you feel ready.    ACTIVITIES: After discharge from the hospital, you may resume full routine activities. However, there should be no heavy lifting (greater than 15 pounds) and no strenuous activities until after your follow-up visit. Otherwise, routine activities of daily living are acceptable.    DRIVING: You may drive whenever you are off pain medications and are able to perform the activities needed to drive, i.e. turning, bending, twisting, etc.    BATHING: You may get the wound wet at any time after leaving the hospital. You may shower, but do not submerge in a bath for at least a week. Dressings may come off after 48 hours.    BOWEL FUNCTION: A few patients, after this operation, will develop either frequent or loose stools after meals. This usually corrects itself after a few days, to a few weeks. If this occurs, do not worry; it is not unusual and will resolve. Much more common than loose stools, is constipation. The combination of pain medication and decreased activity level can cause constipation in otherwise normal patients. If you feel this is occurring, take a laxative (Milk of Magnesia, Ex-Lax, Senokot, etc.) until the problem has resolved.    PAIN MEDICATION: You will be given a prescription for pain medication at discharge. Please take these as directed. It is important to remember not to take medications on an empty stomach as this may cause nausea.    CALL IF YOU HAVE: (1) Fevers to more than 1010 F, (2) Unusual chest or leg pain, (3) Drainage or fluid from incision that may be foul smelling, increased tenderness or soreness at the wound or the wound edges are no longer together, redness or  swelling at the incision site. Please do not hesitate to call with any other questions.     APPOINTMENT: Contact our office at [phone] for a follow-up appointment in 1 to 2 weeks following your procedure.    If you have any additional questions, please do not hesitate to call the office.    Office address:  [address], [address] [place] [address]      ACTIVITY: Rest and take it easy for the first 24 hours.  A responsible adult is recommended to remain with you during that time.  It is normal to feel sleepy.  We encourage you to not do anything that requires balance, judgment or coordination.    MILD FLU-LIKE SYMPTOMS ARE NORMAL. YOU MAY EXPERIENCE GENERALIZED MUSCLE ACHES, THROAT IRRITATION, HEADACHE AND/OR SOME NAUSEA.    FOR 24 HOURS DO NOT:  Drive, operate machinery or run household appliances.  Drink beer or alcoholic beverages.   Make important decisions or sign legal documents.    You should call 911 if you develop problems with breathing or chest pain.  If you are unable to contact your doctor or surgical center, you should go to the nearest emergency room or urgent care center.  Physician's telephone #: Dr. Farrell [phone]    If any questions arise, call your doctor.  If your doctor is not available, please feel free to call the Surgical Center at [phone].     A registered nurse may call you a few days after your surgery to see how you are doing after your procedure.    MEDICATIONS: Resume taking daily medication.  Take prescribed pain medication with food.  If no medication is prescribed, you may take non-aspirin pain medication if needed.  PAIN MEDICATION CAN BE VERY CONSTIPATING.  Take a stool softener or laxative such as senokot, pericolace, or milk of magnesia if needed.    Prescription E-prescribed to pharmacy on record (see page 1 of these instructions). Last pain medication given at 9:50am, 10mg Percocet.    If your physician has prescribed pain medication that includes Acetaminophen  (Tylenol), do not take additional Acetaminophen (Tylenol) while taking the prescribed medication.    Depression / Suicide Risk    As you are discharged from this [facility] facility, it is important to learn how to keep safe from harming yourself.    Recognize the warning signs:  · Abrupt changes in personality, positive or negative- including increase in energy   · Giving away possessions  · Change in eating patterns- significant weight changes-  positive or negative  · Change in sleeping patterns- unable to sleep or sleeping all the time   · Unwillingness or inability to communicate  · Depression  · Unusual sadness, discouragement and loneliness  · Talk of wanting to die  · Neglect of personal appearance   · Rebelliousness- reckless behavior  · Withdrawal from people/activities they love  · Confusion- inability to concentrate     If you or a loved one observes any of these behaviors or has concerns about self-harm, here's what you can do:  · Talk about it- your feelings and reasons for harming yourself  · Remove any means that you might use to hurt yourself (examples: pills, rope, extension cords, firearm)  · Get professional help from the community (Mental Health, Substance Abuse, psychological counseling)  · Do not be alone:Call your Safe Contact- someone whom you trust who will be there for you.  · Call your local CRISIS HOTLINE 914-3450 or 998-423-1501  · Call your local Children's Mobile Crisis Response Team [place] (009) 620-3046 or www.[employer]  · Call the toll free National Suicide Prevention Hotlines   · National Suicide Prevention Lifeline 955-809-IOHV (1432)  · National Hope Line Network 800-SUICIDE (062-5581)

## 2022-03-31 NOTE — ANESTHESIA POSTPROCEDURE EVALUATION
Patient: Slim Rossi    Procedure Summary     Date: 03/31/22 Room / Location:  OR  / SURGERY [facility]    Anesthesia Start: 0828 Anesthesia Stop: 0932    Procedure: CHOLECYSTECTOMY, LAPAROSCOPIC (Abdomen) Diagnosis: (Symptomatic Cholelithiasis)    Surgeons: Cedric Farrell M.D. Responsible Provider: Sabino Hare D.O.    Anesthesia Type: general ASA Status: 2          Final Anesthesia Type: general  Last vitals  BP   Blood Pressure: 148/80    Temp   36 °C (96.8 °F)    Pulse   81   Resp   20    SpO2   97 %      Anesthesia Post Evaluation    Patient location during evaluation: PACU  Patient participation: complete - patient participated  Level of consciousness: awake and alert  Pain score: 4    Airway patency: patent  Anesthetic complications: no  Cardiovascular status: hemodynamically stable  Respiratory status: acceptable  Hydration status: euvolemic    PONV: none          No complications documented.     Nurse Pain Score: 6 (NPRS)

## 2022-03-31 NOTE — ANESTHESIA PROCEDURE NOTES
Airway    Date/Time: 3/31/2022 8:39 AM  Performed by: Sabino Hare D.O.  Authorized by: Sabino Hare D.O.     Location:  OR  Urgency:  Elective  Indications for Airway Management:  Anesthesia      Spontaneous Ventilation: absent    Sedation Level:  Deep  Preoxygenated: Yes    Patient Position:  Sniffing  Mask Difficulty Assessment:  3 - difficult mask (inadequate, unstable or two providers) +/- NMBA  Final Airway Type:  Endotracheal airway  Final Endotracheal Airway:  ETT  Cuffed: Yes    Technique Used for Successful ETT Placement:  Direct laryngoscopy    Insertion Site:  Oral  Blade Type:  Jennifer  Laryngoscope Blade/Videolaryngoscope Blade Size:  4  ETT Size (mm):  8.0  Measured from:  Lips  ETT to Lips (cm):  23  Placement Verified by: auscultation and capnometry    Cormack-Lehane Classification:  Grade IIb - view of arytenoids or posterior of glottis only  Number of Attempts at Approach:  1

## 2022-12-14 ENCOUNTER — HOSPITAL ENCOUNTER (OUTPATIENT)
Dept: LAB | Facility: MEDICAL CENTER | Age: 42
End: 2022-12-14
Attending: PHYSICIAN ASSISTANT
Payer: COMMERCIAL

## 2022-12-14 LAB
ALBUMIN SERPL BCP-MCNC: 4.7 G/DL (ref 3.2–4.9)
ALBUMIN/GLOB SERPL: 1.5 G/DL
ALP SERPL-CCNC: 69 U/L (ref 30–99)
ALT SERPL-CCNC: 44 U/L (ref 2–50)
ANION GAP SERPL CALC-SCNC: 12 MMOL/L (ref 7–16)
AST SERPL-CCNC: 31 U/L (ref 12–45)
BASOPHILS # BLD AUTO: 0.9 % (ref 0–1.8)
BASOPHILS # BLD: 0.09 K/UL (ref 0–0.12)
BILIRUB SERPL-MCNC: 0.4 MG/DL (ref 0.1–1.5)
BUN SERPL-MCNC: 20 MG/DL (ref 8–22)
CALCIUM ALBUM COR SERPL-MCNC: 9 MG/DL (ref 8.5–10.5)
CALCIUM SERPL-MCNC: 9.6 MG/DL (ref 8.5–10.5)
CHLORIDE SERPL-SCNC: 103 MMOL/L (ref 96–112)
CO2 SERPL-SCNC: 24 MMOL/L (ref 20–33)
CREAT SERPL-MCNC: 0.85 MG/DL (ref 0.5–1.4)
EOSINOPHIL # BLD AUTO: 0.37 K/UL (ref 0–0.51)
EOSINOPHIL NFR BLD: 3.9 % (ref 0–6.9)
ERYTHROCYTE [DISTWIDTH] IN BLOOD BY AUTOMATED COUNT: 39.8 FL (ref 35.9–50)
ESTRADIOL SERPL-MCNC: 50.5 PG/ML
GFR SERPLBLD CREATININE-BSD FMLA CKD-EPI: 109 ML/MIN/1.73 M 2
GLOBULIN SER CALC-MCNC: 3.2 G/DL (ref 1.9–3.5)
GLUCOSE SERPL-MCNC: 94 MG/DL (ref 65–99)
HCT VFR BLD AUTO: 57 % (ref 42–52)
HGB BLD-MCNC: 19 G/DL (ref 14–18)
IMM GRANULOCYTES # BLD AUTO: 0.09 K/UL (ref 0–0.11)
IMM GRANULOCYTES NFR BLD AUTO: 0.9 % (ref 0–0.9)
LYMPHOCYTES # BLD AUTO: 2.38 K/UL (ref 1–4.8)
LYMPHOCYTES NFR BLD: 24.9 % (ref 22–41)
MCH RBC QN AUTO: 28.3 PG (ref 27–33)
MCHC RBC AUTO-ENTMCNC: 33.3 G/DL (ref 33.7–35.3)
MCV RBC AUTO: 84.8 FL (ref 81.4–97.8)
MONOCYTES # BLD AUTO: 0.92 K/UL (ref 0–0.85)
MONOCYTES NFR BLD AUTO: 9.6 % (ref 0–13.4)
NEUTROPHILS # BLD AUTO: 5.7 K/UL (ref 1.82–7.42)
NEUTROPHILS NFR BLD: 59.8 % (ref 44–72)
NRBC # BLD AUTO: 0 K/UL
NRBC BLD-RTO: 0 /100 WBC
PLATELET # BLD AUTO: 260 K/UL (ref 164–446)
PMV BLD AUTO: 8.5 FL (ref 9–12.9)
POTASSIUM SERPL-SCNC: 4.1 MMOL/L (ref 3.6–5.5)
PROT SERPL-MCNC: 7.9 G/DL (ref 6–8.2)
RBC # BLD AUTO: 6.72 M/UL (ref 4.7–6.1)
SODIUM SERPL-SCNC: 139 MMOL/L (ref 135–145)
TESTOST SERPL-MCNC: 929 NG/DL (ref 175–781)
WBC # BLD AUTO: 9.6 K/UL (ref 4.8–10.8)

## 2022-12-14 PROCEDURE — 84403 ASSAY OF TOTAL TESTOSTERONE: CPT

## 2022-12-14 PROCEDURE — 36415 COLL VENOUS BLD VENIPUNCTURE: CPT

## 2022-12-14 PROCEDURE — 80053 COMPREHEN METABOLIC PANEL: CPT

## 2022-12-14 PROCEDURE — 85025 COMPLETE CBC W/AUTO DIFF WBC: CPT

## 2022-12-14 PROCEDURE — 82670 ASSAY OF TOTAL ESTRADIOL: CPT

## 2023-01-10 ENCOUNTER — HOSPITAL ENCOUNTER (OUTPATIENT)
Facility: MEDICAL CENTER | Age: 43
End: 2023-01-10
Attending: PHYSICIAN ASSISTANT
Payer: COMMERCIAL

## 2023-01-10 LAB
BASOPHILS # BLD AUTO: 1.1 % (ref 0–1.8)
BASOPHILS # BLD: 0.1 K/UL (ref 0–0.12)
EOSINOPHIL # BLD AUTO: 0.39 K/UL (ref 0–0.51)
EOSINOPHIL NFR BLD: 4.1 % (ref 0–6.9)
ERYTHROCYTE [DISTWIDTH] IN BLOOD BY AUTOMATED COUNT: 41.9 FL (ref 35.9–50)
HCT VFR BLD AUTO: 57.1 % (ref 42–52)
HGB BLD-MCNC: 19 G/DL (ref 14–18)
IMM GRANULOCYTES # BLD AUTO: 0.09 K/UL (ref 0–0.11)
IMM GRANULOCYTES NFR BLD AUTO: 0.9 % (ref 0–0.9)
LYMPHOCYTES # BLD AUTO: 2.24 K/UL (ref 1–4.8)
LYMPHOCYTES NFR BLD: 23.5 % (ref 22–41)
MCH RBC QN AUTO: 28.7 PG (ref 27–33)
MCHC RBC AUTO-ENTMCNC: 33.3 G/DL (ref 33.7–35.3)
MCV RBC AUTO: 86.3 FL (ref 81.4–97.8)
MONOCYTES # BLD AUTO: 0.93 K/UL (ref 0–0.85)
MONOCYTES NFR BLD AUTO: 9.8 % (ref 0–13.4)
NEUTROPHILS # BLD AUTO: 5.77 K/UL (ref 1.82–7.42)
NEUTROPHILS NFR BLD: 60.6 % (ref 44–72)
NRBC # BLD AUTO: 0 K/UL
NRBC BLD-RTO: 0 /100 WBC
PLATELET # BLD AUTO: 262 K/UL (ref 164–446)
PMV BLD AUTO: 8.6 FL (ref 9–12.9)
RBC # BLD AUTO: 6.62 M/UL (ref 4.7–6.1)
WBC # BLD AUTO: 9.5 K/UL (ref 4.8–10.8)

## 2023-01-10 PROCEDURE — 85025 COMPLETE CBC W/AUTO DIFF WBC: CPT

## 2023-02-11 ENCOUNTER — HOSPITAL ENCOUNTER (OUTPATIENT)
Dept: LAB | Facility: MEDICAL CENTER | Age: 43
End: 2023-02-11
Attending: PHYSICIAN ASSISTANT
Payer: COMMERCIAL

## 2023-02-11 LAB
BASOPHILS # BLD AUTO: 0.8 % (ref 0–1.8)
BASOPHILS # BLD: 0.07 K/UL (ref 0–0.12)
EOSINOPHIL # BLD AUTO: 0.41 K/UL (ref 0–0.51)
EOSINOPHIL NFR BLD: 4.6 % (ref 0–6.9)
ERYTHROCYTE [DISTWIDTH] IN BLOOD BY AUTOMATED COUNT: 38.1 FL (ref 35.9–50)
HCT VFR BLD AUTO: 50.9 % (ref 42–52)
HGB BLD-MCNC: 17.3 G/DL (ref 14–18)
IMM GRANULOCYTES # BLD AUTO: 0.02 K/UL (ref 0–0.11)
IMM GRANULOCYTES NFR BLD AUTO: 0.2 % (ref 0–0.9)
LYMPHOCYTES # BLD AUTO: 2.22 K/UL (ref 1–4.8)
LYMPHOCYTES NFR BLD: 24.7 % (ref 22–41)
MCH RBC QN AUTO: 28.5 PG (ref 27–33)
MCHC RBC AUTO-ENTMCNC: 34 G/DL (ref 33.7–35.3)
MCV RBC AUTO: 83.9 FL (ref 81.4–97.8)
MONOCYTES # BLD AUTO: 0.73 K/UL (ref 0–0.85)
MONOCYTES NFR BLD AUTO: 8.1 % (ref 0–13.4)
NEUTROPHILS # BLD AUTO: 5.52 K/UL (ref 1.82–7.42)
NEUTROPHILS NFR BLD: 61.6 % (ref 44–72)
NRBC # BLD AUTO: 0 K/UL
NRBC BLD-RTO: 0 /100 WBC
PLATELET # BLD AUTO: 265 K/UL (ref 164–446)
PMV BLD AUTO: 8.8 FL (ref 9–12.9)
RBC # BLD AUTO: 6.07 M/UL (ref 4.7–6.1)
WBC # BLD AUTO: 9 K/UL (ref 4.8–10.8)

## 2023-02-11 PROCEDURE — 36415 COLL VENOUS BLD VENIPUNCTURE: CPT

## 2023-02-11 PROCEDURE — 85025 COMPLETE CBC W/AUTO DIFF WBC: CPT

## 2023-06-19 ENCOUNTER — HOSPITAL ENCOUNTER (OUTPATIENT)
Dept: LAB | Facility: MEDICAL CENTER | Age: 43
End: 2023-06-19
Attending: NURSE PRACTITIONER
Payer: COMMERCIAL

## 2023-06-19 ENCOUNTER — OFFICE VISIT (OUTPATIENT)
Dept: MEDICAL GROUP | Facility: MEDICAL CENTER | Age: 43
End: 2023-06-19
Payer: COMMERCIAL

## 2023-06-19 VITALS
WEIGHT: 315 LBS | SYSTOLIC BLOOD PRESSURE: 128 MMHG | BODY MASS INDEX: 37.19 KG/M2 | DIASTOLIC BLOOD PRESSURE: 72 MMHG | HEIGHT: 77 IN | HEART RATE: 86 BPM | TEMPERATURE: 97.7 F | OXYGEN SATURATION: 95 %

## 2023-06-19 DIAGNOSIS — E78.5 DYSLIPIDEMIA: Chronic | ICD-10-CM

## 2023-06-19 DIAGNOSIS — F17.210 CIGARETTE NICOTINE DEPENDENCE WITHOUT COMPLICATION: ICD-10-CM

## 2023-06-19 DIAGNOSIS — R73.03 PREDIABETES: ICD-10-CM

## 2023-06-19 DIAGNOSIS — M25.50 ARTHRALGIA, UNSPECIFIED JOINT: ICD-10-CM

## 2023-06-19 DIAGNOSIS — R63.5 WEIGHT GAIN: ICD-10-CM

## 2023-06-19 DIAGNOSIS — Z12.12 SCREENING FOR COLORECTAL CANCER: ICD-10-CM

## 2023-06-19 DIAGNOSIS — Z12.11 SCREENING FOR COLORECTAL CANCER: ICD-10-CM

## 2023-06-19 PROBLEM — K80.20 CALCULUS OF GALLBLADDER WITHOUT CHOLECYSTITIS WITHOUT OBSTRUCTION: Chronic | Status: RESOLVED | Noted: 2021-04-05 | Resolved: 2023-06-19

## 2023-06-19 PROBLEM — R10.11 COLICKY RUQ ABDOMINAL PAIN: Status: RESOLVED | Noted: 2021-03-31 | Resolved: 2023-06-19

## 2023-06-19 PROBLEM — G89.18 POSTOPERATIVE PAIN: Status: RESOLVED | Noted: 2022-03-31 | Resolved: 2023-06-19

## 2023-06-19 LAB
ALBUMIN SERPL BCP-MCNC: 4.2 G/DL (ref 3.2–4.9)
ALBUMIN/GLOB SERPL: 1.3 G/DL
ALP SERPL-CCNC: 73 U/L (ref 30–99)
ALT SERPL-CCNC: 36 U/L (ref 2–50)
ANION GAP SERPL CALC-SCNC: 14 MMOL/L (ref 7–16)
AST SERPL-CCNC: 25 U/L (ref 12–45)
BILIRUB SERPL-MCNC: 0.5 MG/DL (ref 0.1–1.5)
BUN SERPL-MCNC: 13 MG/DL (ref 8–22)
CALCIUM ALBUM COR SERPL-MCNC: 8.6 MG/DL (ref 8.5–10.5)
CALCIUM SERPL-MCNC: 8.8 MG/DL (ref 8.5–10.5)
CHLORIDE SERPL-SCNC: 102 MMOL/L (ref 96–112)
CHOLEST SERPL-MCNC: 177 MG/DL (ref 100–199)
CO2 SERPL-SCNC: 20 MMOL/L (ref 20–33)
CREAT SERPL-MCNC: 0.79 MG/DL (ref 0.5–1.4)
EST. AVERAGE GLUCOSE BLD GHB EST-MCNC: 120 MG/DL
GFR SERPLBLD CREATININE-BSD FMLA CKD-EPI: 111 ML/MIN/1.73 M 2
GLOBULIN SER CALC-MCNC: 3.3 G/DL (ref 1.9–3.5)
GLUCOSE SERPL-MCNC: 97 MG/DL (ref 65–99)
HBA1C MFR BLD: 5.8 % (ref 4–5.6)
HDLC SERPL-MCNC: 32 MG/DL
LDLC SERPL CALC-MCNC: 118 MG/DL
POTASSIUM SERPL-SCNC: 4.2 MMOL/L (ref 3.6–5.5)
PROT SERPL-MCNC: 7.5 G/DL (ref 6–8.2)
SODIUM SERPL-SCNC: 136 MMOL/L (ref 135–145)
TRIGL SERPL-MCNC: 133 MG/DL (ref 0–149)
TSH SERPL DL<=0.005 MIU/L-ACNC: 0.86 UIU/ML (ref 0.38–5.33)
URATE SERPL-MCNC: 5.1 MG/DL (ref 2.5–8.3)

## 2023-06-19 PROCEDURE — 36415 COLL VENOUS BLD VENIPUNCTURE: CPT

## 2023-06-19 PROCEDURE — 99214 OFFICE O/P EST MOD 30 MIN: CPT | Performed by: NURSE PRACTITIONER

## 2023-06-19 PROCEDURE — 80061 LIPID PANEL: CPT

## 2023-06-19 PROCEDURE — 84550 ASSAY OF BLOOD/URIC ACID: CPT

## 2023-06-19 PROCEDURE — 3074F SYST BP LT 130 MM HG: CPT | Performed by: NURSE PRACTITIONER

## 2023-06-19 PROCEDURE — 83036 HEMOGLOBIN GLYCOSYLATED A1C: CPT

## 2023-06-19 PROCEDURE — 3078F DIAST BP <80 MM HG: CPT | Performed by: NURSE PRACTITIONER

## 2023-06-19 PROCEDURE — 80053 COMPREHEN METABOLIC PANEL: CPT

## 2023-06-19 PROCEDURE — 84443 ASSAY THYROID STIM HORMONE: CPT

## 2023-06-19 RX ORDER — VARENICLINE TARTRATE 0.5 (11)-1
KIT ORAL
Qty: 53 EACH | Refills: 0 | Status: SHIPPED | OUTPATIENT
Start: 2023-06-19 | End: 2024-02-26

## 2023-06-19 RX ORDER — VARENICLINE TARTRATE 1 MG/1
1 TABLET, FILM COATED ORAL 2 TIMES DAILY
Qty: 60 TABLET | Refills: 3 | Status: SHIPPED | OUTPATIENT
Start: 2023-06-19 | End: 2023-07-11

## 2023-06-19 ASSESSMENT — FIBROSIS 4 INDEX: FIB4 SCORE: 0.79

## 2023-06-19 ASSESSMENT — PATIENT HEALTH QUESTIONNAIRE - PHQ9: CLINICAL INTERPRETATION OF PHQ2 SCORE: 0

## 2023-06-19 NOTE — PROGRESS NOTES
"Chief Complaint   Patient presents with    Requesting Labs     General Lab Work    Leg Swelling     Joint Pain       Subjective:     HPI:     Slim Rossi is a 43 y.o. male here to discuss the following:    Last office appointment February 2022.    He would like to update his blood work.   He tells me that he has gained some weight.  Fell off of his previously established diet.  Primarily follows a keto diet.  He started to have some shoulder, hip and foot pain he attributed this to having poor diet and gaining weight.    He is also interested in quitting smoking.  Has tried over-the-counter product such as patches and gum however ineffective.  He also tried Wellbutrin however this was ineffective.  Smoke about half a pack of cigarettes a day as well as vaping.    Wt Readings from Last 4 Encounters:   06/19/23 (!) 150 kg (331 lb)   03/29/22 (!) 145 kg (319 lb 10.7 oz)   02/23/22 (!) 140 kg (308 lb 3.2 oz)   04/26/21 (!) 140 kg (308 lb)         ROS: : see above        Current Outpatient Medications:     VITAMIN D-VITAMIN K PO, Take  by mouth., Disp: , Rfl:     Varenicline Tartrate, Starter, 0.5 MG X 11 & 1 MG X 42 Tablet Therapy Pack, As directed on starter pack., Disp: 53 Each, Rfl: 0    varenicline (CHANTIX) 1 MG tablet, Take 1 Tablet by mouth 2 times a day., Disp: 60 Tablet, Rfl: 3    acetaminophen (TYLENOL) 325 MG Tab, Take 2 Tablets by mouth every 6 hours. Alternate w/ ibuprofen to take a medication every 3 hrs, take scheduled for 3 days post-op then PRN, Disp: 60 Tablet, Rfl: 0    testosterone cypionate (DEPO-TESTOSTERONE) 200 MG/ML Solution injection, testosterone cypionate 200 mg/mL intramuscular oil, Disp: , Rfl:     No Known Allergies    Objective:     Vitals: /72 (BP Location: Right arm, Patient Position: Sitting, BP Cuff Size: Large adult)   Pulse 86   Temp 36.5 °C (97.7 °F) (Temporal)   Ht 1.956 m (6' 5\")   Wt (!) 150 kg (331 lb)   SpO2 95%   BMI 39.25 kg/m²    General: Alert, " pleasant, NAD  HEENT: Normocephalic.  Neck supple.   Respiratory: no distress, no audible wheezing, RR -WNL  Skin: Warm, dry, no rashes.  Extremities: No leg edema. No discoloration  Neurological: No tremors  Psych:  Affect/mood is normal, judgement is good, memory is intact, grooming is appropriate.    Assessment/Plan:      1. Weight gain  Has gained weight, recently resuming his previously tolerated and effective keto diet.  Update TSH and A1c as history of prediabetes.  - HEMOGLOBIN A1C; Future  - TSH; Future    2. Dyslipidemia  Chronic.  Not on statin therapy.  Primary endorsing keto diet.  Update labs.  - Lipid Profile; Future  - Comp Metabolic Panel; Future  - TSH; Future    3. Prediabetes  History of prediabetes.  Increased weight gain.  Update A1c.  - HEMOGLOBIN A1C; Future  - TSH; Future    4. Arthralgia, unspecified joint  Intermittent joint pain.  Does endorse a keto diet.  Check uric acid level.  - URIC ACID; Future    5. Screening for colorectal cancer  - COLFERNANDO (FIT DNA)    6. Cigarette nicotine dependence without complication  Chronic.  To smoking half a pack cigarettes a day as well as vaping.  Interested in quitting smoking.  Wellbutrin ineffective, over-the-counter patches as well as gum ineffective.  He would like to trial Varenicline-have discussed common side effects and ADRs.  - Varenicline Tartrate, Starter, 0.5 MG X 11 & 1 MG X 42 Tablet Therapy Pack; As directed on starter pack.  Dispense: 53 Each; Refill: 0  - varenicline (CHANTIX) 1 MG tablet; Take 1 Tablet by mouth 2 times a day.  Dispense: 60 Tablet; Refill: 3      Return for Abnormal labs.    {I have placed the above orders and discussed them with an approved delegating provider. The MA is performing the below orders under the direction of Dr. Gene NARAYANAN

## 2023-07-06 DIAGNOSIS — R19.5 POSITIVE COLORECTAL CANCER SCREENING USING COLOGUARD TEST: ICD-10-CM

## 2023-07-11 DIAGNOSIS — F17.210 CIGARETTE NICOTINE DEPENDENCE WITHOUT COMPLICATION: ICD-10-CM

## 2023-07-11 RX ORDER — VARENICLINE TARTRATE 1 MG/1
TABLET, FILM COATED ORAL
Qty: 60 TABLET | Refills: 3 | Status: SHIPPED | OUTPATIENT
Start: 2023-07-11 | End: 2023-09-25

## 2023-09-25 DIAGNOSIS — F17.210 CIGARETTE NICOTINE DEPENDENCE WITHOUT COMPLICATION: ICD-10-CM

## 2023-09-25 RX ORDER — VARENICLINE TARTRATE 1 MG/1
TABLET, FILM COATED ORAL
Qty: 180 TABLET | Refills: 1 | Status: SHIPPED | OUTPATIENT
Start: 2023-09-25 | End: 2024-02-26

## 2023-10-09 PROBLEM — K63.5 COLON POLYPS: Status: ACTIVE | Noted: 2023-10-09

## 2023-11-19 ENCOUNTER — HOSPITAL ENCOUNTER (OUTPATIENT)
Dept: RADIOLOGY | Facility: MEDICAL CENTER | Age: 43
End: 2023-11-19
Attending: NURSE PRACTITIONER
Payer: COMMERCIAL

## 2023-11-19 DIAGNOSIS — R19.01 ABDOMINAL MASS, RUQ (RIGHT UPPER QUADRANT): ICD-10-CM

## 2023-11-19 DIAGNOSIS — R19.5 ABNORMAL FECES: ICD-10-CM

## 2023-11-19 DIAGNOSIS — E66.9 OBESITY, UNSPECIFIED CLASSIFICATION, UNSPECIFIED OBESITY TYPE, UNSPECIFIED WHETHER SERIOUS COMORBIDITY PRESENT: ICD-10-CM

## 2023-11-19 DIAGNOSIS — B18.2 CHRONIC HEPATITIS C WITHOUT HEPATIC COMA (HCC): ICD-10-CM

## 2023-11-19 DIAGNOSIS — F17.210 CIGARETTE SMOKER: ICD-10-CM

## 2023-11-19 PROCEDURE — 76705 ECHO EXAM OF ABDOMEN: CPT

## 2024-01-15 ENCOUNTER — HOSPITAL ENCOUNTER (OUTPATIENT)
Dept: LAB | Facility: MEDICAL CENTER | Age: 44
End: 2024-01-15
Attending: PHYSICIAN ASSISTANT
Payer: COMMERCIAL

## 2024-01-15 LAB
BASOPHILS # BLD AUTO: 1 % (ref 0–1.8)
BASOPHILS # BLD: 0.11 K/UL (ref 0–0.12)
EOSINOPHIL # BLD AUTO: 0.39 K/UL (ref 0–0.51)
EOSINOPHIL NFR BLD: 3.6 % (ref 0–6.9)
ERYTHROCYTE [DISTWIDTH] IN BLOOD BY AUTOMATED COUNT: 40.9 FL (ref 35.9–50)
HCT VFR BLD AUTO: 57.4 % (ref 42–52)
HGB BLD-MCNC: 19.2 G/DL (ref 14–18)
IMM GRANULOCYTES # BLD AUTO: 0.14 K/UL (ref 0–0.11)
IMM GRANULOCYTES NFR BLD AUTO: 1.3 % (ref 0–0.9)
LYMPHOCYTES # BLD AUTO: 2.7 K/UL (ref 1–4.8)
LYMPHOCYTES NFR BLD: 24.8 % (ref 22–41)
MCH RBC QN AUTO: 28.2 PG (ref 27–33)
MCHC RBC AUTO-ENTMCNC: 33.4 G/DL (ref 32.3–36.5)
MCV RBC AUTO: 84.2 FL (ref 81.4–97.8)
MONOCYTES # BLD AUTO: 1.03 K/UL (ref 0–0.85)
MONOCYTES NFR BLD AUTO: 9.4 % (ref 0–13.4)
NEUTROPHILS # BLD AUTO: 6.53 K/UL (ref 1.82–7.42)
NEUTROPHILS NFR BLD: 59.9 % (ref 44–72)
NRBC # BLD AUTO: 0 K/UL
NRBC BLD-RTO: 0 /100 WBC (ref 0–0.2)
PLATELET # BLD AUTO: 241 K/UL (ref 164–446)
PMV BLD AUTO: 8.3 FL (ref 9–12.9)
PSA SERPL-MCNC: 0.27 NG/ML (ref 0–4)
RBC # BLD AUTO: 6.82 M/UL (ref 4.7–6.1)
TESTOST SERPL-MCNC: 430 NG/DL (ref 175–781)
WBC # BLD AUTO: 10.9 K/UL (ref 4.8–10.8)

## 2024-01-15 PROCEDURE — 84153 ASSAY OF PSA TOTAL: CPT

## 2024-01-15 PROCEDURE — 84403 ASSAY OF TOTAL TESTOSTERONE: CPT

## 2024-01-15 PROCEDURE — 36415 COLL VENOUS BLD VENIPUNCTURE: CPT

## 2024-01-15 PROCEDURE — 85025 COMPLETE CBC W/AUTO DIFF WBC: CPT

## 2024-01-20 ENCOUNTER — HOSPITAL ENCOUNTER (OUTPATIENT)
Dept: LAB | Facility: MEDICAL CENTER | Age: 44
End: 2024-01-20
Attending: UROLOGY
Payer: COMMERCIAL

## 2024-01-20 LAB
BASOPHILS # BLD AUTO: 0.6 % (ref 0–1.8)
BASOPHILS # BLD: 0.07 K/UL (ref 0–0.12)
EOSINOPHIL # BLD AUTO: 0.35 K/UL (ref 0–0.51)
EOSINOPHIL NFR BLD: 3.2 % (ref 0–6.9)
ERYTHROCYTE [DISTWIDTH] IN BLOOD BY AUTOMATED COUNT: 40.7 FL (ref 35.9–50)
HCT VFR BLD AUTO: 54.1 % (ref 42–52)
HGB BLD-MCNC: 18.1 G/DL (ref 14–18)
IMM GRANULOCYTES # BLD AUTO: 0.09 K/UL (ref 0–0.11)
IMM GRANULOCYTES NFR BLD AUTO: 0.8 % (ref 0–0.9)
LYMPHOCYTES # BLD AUTO: 2.33 K/UL (ref 1–4.8)
LYMPHOCYTES NFR BLD: 21.6 % (ref 22–41)
MCH RBC QN AUTO: 28.3 PG (ref 27–33)
MCHC RBC AUTO-ENTMCNC: 33.5 G/DL (ref 32.3–36.5)
MCV RBC AUTO: 84.7 FL (ref 81.4–97.8)
MONOCYTES # BLD AUTO: 1.01 K/UL (ref 0–0.85)
MONOCYTES NFR BLD AUTO: 9.4 % (ref 0–13.4)
NEUTROPHILS # BLD AUTO: 6.95 K/UL (ref 1.82–7.42)
NEUTROPHILS NFR BLD: 64.4 % (ref 44–72)
NRBC # BLD AUTO: 0 K/UL
NRBC BLD-RTO: 0 /100 WBC (ref 0–0.2)
PLATELET # BLD AUTO: 233 K/UL (ref 164–446)
PMV BLD AUTO: 8.6 FL (ref 9–12.9)
RBC # BLD AUTO: 6.39 M/UL (ref 4.7–6.1)
WBC # BLD AUTO: 10.8 K/UL (ref 4.8–10.8)

## 2024-01-20 PROCEDURE — 36415 COLL VENOUS BLD VENIPUNCTURE: CPT

## 2024-01-20 PROCEDURE — 85025 COMPLETE CBC W/AUTO DIFF WBC: CPT

## 2024-02-26 ENCOUNTER — OFFICE VISIT (OUTPATIENT)
Dept: MEDICAL GROUP | Facility: MEDICAL CENTER | Age: 44
End: 2024-02-26
Payer: COMMERCIAL

## 2024-02-26 VITALS
HEIGHT: 77 IN | OXYGEN SATURATION: 91 % | SYSTOLIC BLOOD PRESSURE: 142 MMHG | HEART RATE: 78 BPM | TEMPERATURE: 97.8 F | WEIGHT: 315 LBS | RESPIRATION RATE: 16 BRPM | DIASTOLIC BLOOD PRESSURE: 78 MMHG | BODY MASS INDEX: 37.19 KG/M2

## 2024-02-26 DIAGNOSIS — F17.210 CIGARETTE NICOTINE DEPENDENCE WITHOUT COMPLICATION: ICD-10-CM

## 2024-02-26 DIAGNOSIS — R03.0 ELEVATED BLOOD PRESSURE READING: ICD-10-CM

## 2024-02-26 DIAGNOSIS — E78.5 DYSLIPIDEMIA: ICD-10-CM

## 2024-02-26 DIAGNOSIS — Z71.3 WEIGHT LOSS COUNSELING, ENCOUNTER FOR: ICD-10-CM

## 2024-02-26 DIAGNOSIS — E66.01 MORBID OBESITY WITH BMI OF 40.0-44.9, ADULT (HCC): ICD-10-CM

## 2024-02-26 DIAGNOSIS — Z23 NEED FOR VACCINATION: ICD-10-CM

## 2024-02-26 DIAGNOSIS — R73.03 PREDIABETES: ICD-10-CM

## 2024-02-26 DIAGNOSIS — R63.5 WEIGHT GAIN: ICD-10-CM

## 2024-02-26 PROBLEM — Z87.891 HISTORY OF TOBACCO ABUSE: Chronic | Status: RESOLVED | Noted: 2017-06-06 | Resolved: 2024-02-26

## 2024-02-26 PROCEDURE — 90471 IMMUNIZATION ADMIN: CPT | Performed by: NURSE PRACTITIONER

## 2024-02-26 PROCEDURE — 99214 OFFICE O/P EST MOD 30 MIN: CPT | Mod: 25 | Performed by: NURSE PRACTITIONER

## 2024-02-26 PROCEDURE — 3077F SYST BP >= 140 MM HG: CPT | Performed by: NURSE PRACTITIONER

## 2024-02-26 PROCEDURE — 3078F DIAST BP <80 MM HG: CPT | Performed by: NURSE PRACTITIONER

## 2024-02-26 PROCEDURE — 90677 PCV20 VACCINE IM: CPT | Performed by: NURSE PRACTITIONER

## 2024-02-26 PROCEDURE — 90715 TDAP VACCINE 7 YRS/> IM: CPT | Performed by: NURSE PRACTITIONER

## 2024-02-26 PROCEDURE — 90472 IMMUNIZATION ADMIN EACH ADD: CPT | Performed by: NURSE PRACTITIONER

## 2024-02-26 ASSESSMENT — PATIENT HEALTH QUESTIONNAIRE - PHQ9: CLINICAL INTERPRETATION OF PHQ2 SCORE: 0

## 2024-02-26 ASSESSMENT — FIBROSIS 4 INDEX: FIB4 SCORE: 0.8

## 2024-02-26 NOTE — PROGRESS NOTES
"Subjective:     HPI:     Slim Rossi is a 43 y.o. male  presents to discuss:   Chief Complaint   Patient presents with    Medication Management     Pt wants to discuss Ozempic      In the process of buying a house  Wanting to quit smoking-concerned for weight gain after.   Weight has increased.   Endorse sweets over the weekends.       Wt Readings from Last 4 Encounters:   02/26/24 (!) 159 kg (349 lb 6.9 oz)   06/19/23 (!) 150 kg (331 lb)   03/29/22 (!) 145 kg (319 lb 10.7 oz)   02/23/22 (!) 140 kg (308 lb 3.2 oz)     ROS: : see above      Current Outpatient Medications:     VITAMIN D-VITAMIN K PO, Take  by mouth., Disp: , Rfl:     acetaminophen (TYLENOL) 325 MG Tab, Take 2 Tablets by mouth every 6 hours. Alternate w/ ibuprofen to take a medication every 3 hrs, take scheduled for 3 days post-op then PRN, Disp: 60 Tablet, Rfl: 0    testosterone cypionate (DEPO-TESTOSTERONE) 200 MG/ML Solution injection, testosterone cypionate 200 mg/mL intramuscular oil, Disp: , Rfl:     No Known Allergies    Objective:     Vitals: BP (!) 142/78   Pulse 78   Temp 36.6 °C (97.8 °F) (Temporal)   Resp 16   Ht 1.956 m (6' 5\")   Wt (!) 159 kg (349 lb 6.9 oz)   SpO2 91%   BMI 41.44 kg/m²    General: Alert, pleasant, NAD  HEENT: Normocephalic.  Neck supple.   Respiratory: no distress, no audible wheezing, RR -WNL  Skin: Warm, dry, no rashes.  Extremities: No leg edema. No discoloration  Neurological: No tremors  Psych:  Affect/mood is normal, judgement is good, memory is intact, grooming is appropriate.    Assessment/Plan:      1. Weight loss counseling, encounter for  2. Weight gain  3. Morbid obesity with BMI of 40.0-44.9, adult (HCC)  Chronic. Not controlled. Update  TSH and A1c.   Ref to Pharmacotherapy service to discuss options for weight loss.   - TSH; Future  - Referral to Pharmacotherapy Service    4. Prediabetes  - HEMOGLOBIN A1C; Future  - Comp Metabolic Panel; Future    5. Dyslipidemia  Chronic. Update lipid " panel.  The 10-year ASCVD risk score (Snhea CASTRO, et al., 2019) is: 9.1%  Most likely I would recommend statin-  - Lipid Profile; Future  - Comp Metabolic Panel; Future    6. Cigarette nicotine dependence without complication  Chronic. He is contemplating quitting.   Needs to work on reducing stress.     7. Elevated blood pressure reading  142/78 on recheck  Recommend lifestyle modifications.   Monitor.   Consider starting on Losartan.  He will check BP at home.    8. Need for vaccination  - Tdap Vaccine =>6YO IM  - Pneumococcal Conjugate Vaccine 20-Valent (6 wks+)      Return for 3-4 months..    {I have placed the above orders and discussed them with an approved delegating provider. The MA is performing the below orders under the direction of Dr. Matt NARAYANAN

## 2024-02-29 ENCOUNTER — TELEPHONE (OUTPATIENT)
Dept: HEALTH INFORMATION MANAGEMENT | Facility: OTHER | Age: 44
End: 2024-02-29
Payer: COMMERCIAL

## 2024-03-02 ENCOUNTER — HOSPITAL ENCOUNTER (OUTPATIENT)
Dept: LAB | Facility: MEDICAL CENTER | Age: 44
End: 2024-03-02
Attending: NURSE PRACTITIONER
Payer: COMMERCIAL

## 2024-03-02 DIAGNOSIS — R73.03 PREDIABETES: ICD-10-CM

## 2024-03-02 DIAGNOSIS — R63.5 WEIGHT GAIN: ICD-10-CM

## 2024-03-02 DIAGNOSIS — E78.5 DYSLIPIDEMIA: ICD-10-CM

## 2024-03-02 LAB
ALBUMIN SERPL BCP-MCNC: 4.1 G/DL (ref 3.2–4.9)
ALBUMIN/GLOB SERPL: 1.3 G/DL
ALP SERPL-CCNC: 67 U/L (ref 30–99)
ALT SERPL-CCNC: 37 U/L (ref 2–50)
ANION GAP SERPL CALC-SCNC: 11 MMOL/L (ref 7–16)
AST SERPL-CCNC: 31 U/L (ref 12–45)
BILIRUB SERPL-MCNC: 0.6 MG/DL (ref 0.1–1.5)
BUN SERPL-MCNC: 15 MG/DL (ref 8–22)
CALCIUM ALBUM COR SERPL-MCNC: 8.7 MG/DL (ref 8.5–10.5)
CALCIUM SERPL-MCNC: 8.8 MG/DL (ref 8.5–10.5)
CHLORIDE SERPL-SCNC: 105 MMOL/L (ref 96–112)
CHOLEST SERPL-MCNC: 184 MG/DL (ref 100–199)
CO2 SERPL-SCNC: 24 MMOL/L (ref 20–33)
CREAT SERPL-MCNC: 0.82 MG/DL (ref 0.5–1.4)
EST. AVERAGE GLUCOSE BLD GHB EST-MCNC: 114 MG/DL
GFR SERPLBLD CREATININE-BSD FMLA CKD-EPI: 110 ML/MIN/1.73 M 2
GLOBULIN SER CALC-MCNC: 3.1 G/DL (ref 1.9–3.5)
GLUCOSE SERPL-MCNC: 105 MG/DL (ref 65–99)
HBA1C MFR BLD: 5.6 % (ref 4–5.6)
HDLC SERPL-MCNC: 29 MG/DL
LDLC SERPL CALC-MCNC: 129 MG/DL
POTASSIUM SERPL-SCNC: 4.2 MMOL/L (ref 3.6–5.5)
PROT SERPL-MCNC: 7.2 G/DL (ref 6–8.2)
SODIUM SERPL-SCNC: 140 MMOL/L (ref 135–145)
TRIGL SERPL-MCNC: 131 MG/DL (ref 0–149)
TSH SERPL DL<=0.005 MIU/L-ACNC: 1.03 UIU/ML (ref 0.38–5.33)

## 2024-03-02 PROCEDURE — 80053 COMPREHEN METABOLIC PANEL: CPT

## 2024-03-02 PROCEDURE — 36415 COLL VENOUS BLD VENIPUNCTURE: CPT

## 2024-03-02 PROCEDURE — 84443 ASSAY THYROID STIM HORMONE: CPT

## 2024-03-02 PROCEDURE — 83036 HEMOGLOBIN GLYCOSYLATED A1C: CPT

## 2024-03-02 PROCEDURE — 80061 LIPID PANEL: CPT

## 2024-03-25 ENCOUNTER — OFFICE VISIT (OUTPATIENT)
Dept: MEDICAL GROUP | Facility: PHYSICIAN GROUP | Age: 44
End: 2024-03-25
Payer: COMMERCIAL

## 2024-03-25 VITALS — WEIGHT: 315 LBS | BODY MASS INDEX: 42.48 KG/M2

## 2024-03-25 DIAGNOSIS — E78.5 DYSLIPIDEMIA: Chronic | ICD-10-CM

## 2024-03-25 DIAGNOSIS — E66.01 MORBID OBESITY WITH BMI OF 40.0-44.9, ADULT (HCC): ICD-10-CM

## 2024-03-25 DIAGNOSIS — I25.10 CORONARY ARTERY DISEASE INVOLVING NATIVE CORONARY ARTERY OF NATIVE HEART WITHOUT ANGINA PECTORIS: Chronic | ICD-10-CM

## 2024-03-25 PROCEDURE — 99402 PREV MED CNSL INDIV APPRX 30: CPT | Performed by: INTERNAL MEDICINE

## 2024-03-25 RX ORDER — SEMAGLUTIDE 0.68 MG/ML
0.25 INJECTION, SOLUTION SUBCUTANEOUS
Qty: 3 ML | Refills: 3 | Status: SHIPPED | OUTPATIENT
Start: 2024-03-25

## 2024-03-25 ASSESSMENT — FIBROSIS 4 INDEX: FIB4 SCORE: 1.01

## 2024-03-25 NOTE — PROGRESS NOTES
Patient Consult Note    TIME IN: 1:39 pm  TIME OUT: 2:11 pm    Primary care physician: ORACIO Zapata    Reason for consult: Obesity/Weight Management    HPI:  Slim Rossi is a 44 y.o. old patient who comes in today for evaluation of above stated problem.    Initial Weight: 349 lbs    Initial BMI: 42.48 kg/m2    Most Recent HbA1c:   Lab Results   Component Value Date/Time    HBA1C 5.6 03/02/2024 07:20 AM      Lab Results   Component Value Date/Time    CREATININE 0.82 03/02/2024 07:20 AM    CREATININE 1.0 04/01/2007 11:10 AM        Obesity Medication History and Current Regimen  None    Current Exercise - None currently. Pt .  Exercise Goal - Increase to goal of 150 min/week. Pt agreeable to start dedicated walking.    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 When home he eats a lot of processed/sugary foods.  Breakfast - Heavy cream and coffee  Lunch - Beef patties (4-6 of these/day)  Dinner - Beef patties, steak, fast food while home from work ([employer] yesterday)  Snack - Pork rinds  Dessert - Carrot cake recently, cheesecake (indulges on weekends)  Beverage - Coffee w/ cream (no sugar), diet soda, sugar free beverages, water    Past Medical History:  Patient Active Problem List    Diagnosis Date Noted    Colon polyps-Colonoscopy 10/2023-recall 7 years 10/09/2023    Cigarette nicotine dependence without complication 02/23/2022    Reduced libido 05/06/2021    Coronary artery disease involving native coronary artery of native heart without angina pectoris 04/26/2021    Dyslipidemia 03/31/2021    Vitamin D deficiency 03/31/2021    Hypogonadism male 07/30/2019    Gastroesophageal reflux disease 10/08/2018    History of substance abuse ([facility]) 06/28/2018    Morbid obesity with BMI of 40.0-44.9, adult ([facility]) 06/02/2017       Past Surgical History:  Past Surgical History:   Procedure Laterality Date    EUFEMIA BY LAPAROSCOPY  3/31/2022    Procedure: CHOLECYSTECTOMY,  LAPAROSCOPIC;  Surgeon: Cedric Farrell M.D.;  Location: SURGERY [facility];  Service: General       Allergies:  Patient has no known allergies.    Social History:  Social History     Socioeconomic History    Marital status:      Spouse name: Not on file    Number of children: Not on file    Years of education: Not on file    Highest education level: Not on file   Occupational History    Not on file   Tobacco Use    Smoking status: Light Smoker     Current packs/day: 0.10     Types: Cigarettes    Smokeless tobacco: Never   Vaping Use    Vaping Use: Every day    Substances: Nicotine, Flavoring    Devices: Refillable tank, 1 mg daily   Substance and Sexual Activity    Alcohol use: Not Currently     Comment: alcoholism October 18, 2014 recovering addict    Drug use: Not Currently     Types: Inhaled, Injected (Skin Popping), Intravenous     Comment: Heroin last used 2014    Sexual activity: Yes     Partners: Female   Other Topics Concern    Not on file   Social History Narrative    Not on file     Social Determinants of Health     Financial Resource Strain: Not on file   Food Insecurity: Not on file   Transportation Needs: Not on file   Physical Activity: Not on file   Stress: Not on file   Social Connections: Not on file   Intimate Partner Violence: Not on file   Housing Stability: Not on file       Family History:  Family History   Problem Relation Age of Onset    Cancer Mother 42        ovarian       Medications:    Current Outpatient Medications:     Semaglutide,0.25 or 0.5MG/DOS, (OZEMPIC, 0.25 OR 0.5 MG/DOSE,) 2 MG/3ML Solution Pen-injector, Inject 0.25 mg under the skin every 7 days., Disp: 3 mL, Rfl: 3    VITAMIN D-VITAMIN K PO, Take  by mouth., Disp: , Rfl:     acetaminophen (TYLENOL) 325 MG Tab, Take 2 Tablets by mouth every 6 hours. Alternate w/ ibuprofen to take a medication every 3 hrs, take scheduled for 3 days post-op then PRN, Disp: 60 Tablet, Rfl: 0    testosterone cypionate (DEPO-TESTOSTERONE)  200 MG/ML Solution injection, testosterone cypionate 200 mg/mL intramuscular oil, Disp: , Rfl:     Labs: Reviewed    Physical Examination:  Vital signs: Wt (!) 162 kg (358 lb 3.2 oz)   BMI 42.48 kg/m²  Body mass index is 42.48 kg/m².    Assessment and Plan:    1. Obesity/Weight Management  Pt presents to clinic stating that he's been struggling w/ his weight every since the pandemic.   He is sedentary a lot of the time d/t work as a .  He does have BMI > 30 - qualifies for GLP1 use.  Pt states that he does very well w/ his diet during the week while working, but when home he eats very poorly.  Discussed w/ pt the importance of make appropriate dietary choices and caloric medication - calories expended should be greater than calories ingested to induce weight loss.  Emphasized that to promote weight loss that pt must have lifestyle modification on top of GLP1 therapy.   Pt does have remarkable cardiac hx as well as HTN/HLD.  No noted personal or Fhx of thyroid carcinoma.    - Medication changes:  START Ozempic 0.25 mg subQ once weekly  Counseled pt regarding MOA, SE, and administration     - Lifestyle changes:  Diet: Maximize lean proteins and veggies. Cut out/down on carbs. Avoid simple sugars.   Exercise: Increase as tolerated    FU: 1 month    Per Ann, LeandroD, BCACP  03/25/24    CC:   ORACIO Zapata

## 2024-04-22 ENCOUNTER — OFFICE VISIT (OUTPATIENT)
Dept: MEDICAL GROUP | Facility: PHYSICIAN GROUP | Age: 44
End: 2024-04-22
Payer: COMMERCIAL

## 2024-04-22 VITALS
RESPIRATION RATE: 13 BRPM | HEART RATE: 71 BPM | HEIGHT: 77 IN | BODY MASS INDEX: 37.19 KG/M2 | WEIGHT: 315 LBS | OXYGEN SATURATION: 98 %

## 2024-04-22 PROCEDURE — 99401 PREV MED CNSL INDIV APPRX 15: CPT | Performed by: PHARMACIST

## 2024-04-22 ASSESSMENT — FIBROSIS 4 INDEX: FIB4 SCORE: 1.01

## 2024-04-22 NOTE — PROGRESS NOTES
Patient Consult Note - Follow Up Visit  Primary care physician: ORACIO Zapata    Reason for consult: Obesity/Weight Management     Time IN:  2:38pm  Time OUT:  2:47pm    HPI:  Slim Rossi is a 44 y.o. old patient who comes in today for evaluation of above stated problem.    Most Recent HbA1c:   Lab Results   Component Value Date/Time    HBA1C 5.6 03/02/2024 07:20 AM      Lab Results   Component Value Date    SODIUM 140 03/02/2024    POTASSIUM 4.2 03/02/2024    CHLORIDE 105 03/02/2024    CO2 24 03/02/2024    GLUCOSE 105 (H) 03/02/2024    BUN 15 03/02/2024    CREATININE 0.82 03/02/2024     Obesity Medication History and Current Regimen  None - Ozempic was not covered by insurance, cost was exceedingly high for patient     Current Exercise - considerably more exercise this past couple weeks, duties at current job had increased where he was putting in more physical labor.  Also in process of moving.     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 When home he eats a lot of processed/sugary foods.  Breakfast - Heavy cream and coffee  Lunch - Beef patties (4-6 of these/day)  Dinner - Beef patties, steak, fast food while home from work ([employer] yesterday)  Snack - Pork rinds  Dessert - Carrot cake recently, cheesecake (indulges on weekends)  Beverage - Coffee w/ cream (no sugar), diet soda, sugar free beverages, water    ROS:  Constitutional: No weight loss  Cardiac: No palpitations or racing heart  Resp: No shortness of breath  Neuro: No numbness or tinging in feet  Endo: No heat or cold intolerance, no polyuria or polydipsia  All other systems were reviewed and were negative.    Past Medical History:  Patient Active Problem List    Diagnosis Date Noted    Colon polyps-Colonoscopy 10/2023-recall 7 years 10/09/2023    Cigarette nicotine dependence without complication 02/23/2022    Reduced libido 05/06/2021    Coronary artery disease involving native coronary artery of native  heart without angina pectoris 04/26/2021    Dyslipidemia 03/31/2021    Vitamin D deficiency 03/31/2021    Hypogonadism male 07/30/2019    Gastroesophageal reflux disease 10/08/2018    History of substance abuse ([facility]) 06/28/2018    Morbid obesity with BMI of 40.0-44.9, adult ([facility]) 06/02/2017       Past Surgical History:  Past Surgical History:   Procedure Laterality Date    EUFEMIA BY LAPAROSCOPY  3/31/2022    Procedure: CHOLECYSTECTOMY, LAPAROSCOPIC;  Surgeon: Cedric Farrell M.D.;  Location: SURGERY [facility];  Service: General       Allergies:  Patient has no known allergies.    Social History:  Social History     Socioeconomic History    Marital status:      Spouse name: Not on file    Number of children: Not on file    Years of education: Not on file    Highest education level: Not on file   Occupational History    Not on file   Tobacco Use    Smoking status: Light Smoker     Current packs/day: 0.10     Types: Cigarettes    Smokeless tobacco: Never   Vaping Use    Vaping Use: Every day    Substances: Nicotine, Flavoring    Devices: Refillable tank, 1 mg daily   Substance and Sexual Activity    Alcohol use: Not Currently     Comment: alcoholism October 18, 2014 recovering addict    Drug use: Not Currently     Types: Inhaled, Injected (Skin Popping), Intravenous     Comment: Heroin last used 2014    Sexual activity: Yes     Partners: Female   Other Topics Concern    Not on file   Social History Narrative    Not on file     Social Determinants of Health     Financial Resource Strain: Not on file   Food Insecurity: Not on file   Transportation Needs: Not on file   Physical Activity: Not on file   Stress: Not on file   Social Connections: Not on file   Intimate Partner Violence: Not on file   Housing Stability: Not on file       Family History:  Family History   Problem Relation Age of Onset    Cancer Mother 42        ovarian       Medications:    Current Outpatient Medications:     Semaglutide,0.25 or  "0.5MG/DOS, (OZEMPIC, 0.25 OR 0.5 MG/DOSE,) 2 MG/3ML Solution Pen-injector, Inject 0.25 mg under the skin every 7 days., Disp: 3 mL, Rfl: 3    VITAMIN D-VITAMIN K PO, Take  by mouth., Disp: , Rfl:     acetaminophen (TYLENOL) 325 MG Tab, Take 2 Tablets by mouth every 6 hours. Alternate w/ ibuprofen to take a medication every 3 hrs, take scheduled for 3 days post-op then PRN, Disp: 60 Tablet, Rfl: 0    testosterone cypionate (DEPO-TESTOSTERONE) 200 MG/ML Solution injection, testosterone cypionate 200 mg/mL intramuscular oil, Disp: , Rfl:     Labs: Reviewed    Physical Examination:  Vital signs: Pulse 71   Resp 13   Ht 1.956 m (6' 5.01\")   Wt (!) 158 kg (349 lb 6.4 oz)   SpO2 98%   BMI 41.42 kg/m²  Body mass index is 41.42 kg/m².  General: No apparent distress, cooperative  Eyes: No scleral icterus or discharge  ENMT: Normal on external inspection of nose, lips, normal thyroid exam  Neck: No abnormal masses on inspection  Resp: Normal effort, clear to auscultation bilaterally   CVS: Regular rate and rhythm, S1 S2 normal, no murmur   Extremities: No edema  Abdomen: abdominal obesity present  Neuro: Alert and oriented  Skin: No rash  Psych: Normal mood and affect, intact memory and able to make informed decisions    Assessment and Plan:    Patient was not successful in starting Ozempic as it was not covered by insurance and out of pocket expensive was exceedingly high.  He did lose ~12lbs since last visit, likely attributable to quite a bit more physical activity over the last couple weeks at work and at home.  Is structuring his nutrition to begin Keto-style plan, which he has done in the past with good success (states he went from 400+ lbs to ~279 prior to 2020 covid outbreak).    At this time, patient is comfortable with managing his nutrition and exercise plan for optimized weight.  Declines referral to nutrition services.    Follow Up:  With PCP    Thank you for allowing me to participate in the care of this " patient.    Liban Stover, PharmD, [facility]  04/22/24    CC:   ORACIO Zapata

## 2025-02-03 ENCOUNTER — OFFICE VISIT (OUTPATIENT)
Dept: MEDICAL GROUP | Facility: MEDICAL CENTER | Age: 45
End: 2025-02-03
Payer: COMMERCIAL

## 2025-02-03 VITALS
SYSTOLIC BLOOD PRESSURE: 166 MMHG | RESPIRATION RATE: 16 BRPM | HEART RATE: 74 BPM | OXYGEN SATURATION: 98 % | BODY MASS INDEX: 37.19 KG/M2 | DIASTOLIC BLOOD PRESSURE: 86 MMHG | WEIGHT: 315 LBS | HEIGHT: 77 IN | TEMPERATURE: 97.6 F

## 2025-02-03 DIAGNOSIS — E78.5 DYSLIPIDEMIA: ICD-10-CM

## 2025-02-03 DIAGNOSIS — I25.10 CORONARY ARTERY DISEASE INVOLVING NATIVE CORONARY ARTERY OF NATIVE HEART WITHOUT ANGINA PECTORIS: ICD-10-CM

## 2025-02-03 DIAGNOSIS — I10 ESSENTIAL HYPERTENSION: ICD-10-CM

## 2025-02-03 DIAGNOSIS — F17.210 CIGARETTE NICOTINE DEPENDENCE WITHOUT COMPLICATION: Chronic | ICD-10-CM

## 2025-02-03 DIAGNOSIS — R06.83 SNORING: ICD-10-CM

## 2025-02-03 DIAGNOSIS — R06.09 DOE (DYSPNEA ON EXERTION): ICD-10-CM

## 2025-02-03 DIAGNOSIS — R73.03 PREDIABETES: ICD-10-CM

## 2025-02-03 DIAGNOSIS — D75.1 POLYCYTHEMIA: ICD-10-CM

## 2025-02-03 DIAGNOSIS — E66.01 MORBID OBESITY WITH BMI OF 40.0-44.9, ADULT (HCC): ICD-10-CM

## 2025-02-03 PROCEDURE — 3079F DIAST BP 80-89 MM HG: CPT | Performed by: NURSE PRACTITIONER

## 2025-02-03 PROCEDURE — 99214 OFFICE O/P EST MOD 30 MIN: CPT | Performed by: NURSE PRACTITIONER

## 2025-02-03 PROCEDURE — 3077F SYST BP >= 140 MM HG: CPT | Performed by: NURSE PRACTITIONER

## 2025-02-03 RX ORDER — LOSARTAN POTASSIUM 50 MG/1
50 TABLET ORAL DAILY
Qty: 100 TABLET | Refills: 0 | Status: SHIPPED | OUTPATIENT
Start: 2025-02-03 | End: 2026-03-10

## 2025-02-03 RX ORDER — TAMSULOSIN HYDROCHLORIDE 0.4 MG/1
0.4 CAPSULE ORAL DAILY
COMMUNITY
Start: 2025-01-21

## 2025-02-03 ASSESSMENT — FIBROSIS 4 INDEX: FIB4 SCORE: 1.01

## 2025-02-03 ASSESSMENT — PATIENT HEALTH QUESTIONNAIRE - PHQ9: CLINICAL INTERPRETATION OF PHQ2 SCORE: 0

## 2025-02-03 NOTE — PROGRESS NOTES
Subjective:     Slim Rossi is a 44 y.o. male presents to discuss:   Chief Complaint   Patient presents with    Follow-Up     Discuss getting BP medication, blood pressure has been high, headaches      Verbal consent was acquired by the patient to use [employer] ambient listening note generation during this visit Yes   History of Present Illness  The patient is a 44-year-old male who presents for evaluation of elevated blood pressure, shortness of breath, sleep apnea, prediabetes, and weight management.    He has been experiencing elevated blood pressure readings for several months, with systolic values consistently in the 160s and diastolic values around 100. He has recorded readings such as 176/100, 170/97 on 12/05/2024, 171/96 on 12/03/2024, 175/107, 170/101, 165/100, and 174/104. A reading of 130/85 was noted on 11/24/2024. He has not been on any antihypertensive medications previously. He reports no leg swelling. He admits to a high salt intake and experiences muscle cramps when he does not consume electrolytes. He drinks an electrolyte drink daily containing sodium.    He has been experiencing headaches, which he attributes to his elevated blood pressure. He also reports shortness of breath during physical exertion, such as when carrying pellets or taking out the trash. He does not experience chest pain, dizziness, sweating.  He has been employed as a  for over 9 years and has not noticed any significant weight changes over the past year. He has attempted to obtain Ozempic for weight loss but was unsuccessful due to insurance issues.    He is currently undergoing evaluation for sleep apnea, which he reports he is doing out of pocket- He reports symptoms of snoring and apneic episodes. He does not believe he will be able to tolerate a CPAP mask.    He has a history of prediabetes, with an A1c level of 5.8 recorded 11 months ago and 5.6 before that. He was adhering to a diet at that time. He  "has been making efforts to lose weight and improve his health. He recently quit smoking cigarettes but continues to use vaping products and nicotine patches.    Supplemental Information  He is on tamsulosin for his enlarged prostate. He is still doing the testosterone stuff, but they lowered it down to 0.75 from 1 mL because his blood is thick.    SOCIAL HISTORY  The patient is a . He stopped smoking cigarettes a couple of weeks ago but took a drag off one last week. He is still vaping and using nicotine patches.    FAMILY HISTORY  His maternal grandmother had multiple heart attacks and was a smoker.      Wt Readings from Last 4 Encounters:   02/03/25 (!) 157 kg (346 lb)   04/22/24 (!) 158 kg (349 lb 6.4 oz)   03/25/24 (!) 162 kg (358 lb 3.2 oz)   02/26/24 (!) 159 kg (349 lb 6.9 oz)       ROS: : see above      Current Outpatient Medications:     tamsulosin (FLOMAX) 0.4 MG capsule, Take 0.4 mg by mouth every day., Disp: , Rfl:     losartan (COZAAR) 50 MG Tab, Take 1 Tablet by mouth every day., Disp: 100 Tablet, Rfl: 0    Semaglutide,0.25 or 0.5MG/DOS, (OZEMPIC, 0.25 OR 0.5 MG/DOSE,) 2 MG/3ML Solution Pen-injector, Inject 0.25 mg under the skin every 7 days., Disp: 3 mL, Rfl: 3    VITAMIN D-VITAMIN K PO, Take  by mouth., Disp: , Rfl:     acetaminophen (TYLENOL) 325 MG Tab, Take 2 Tablets by mouth every 6 hours. Alternate w/ ibuprofen to take a medication every 3 hrs, take scheduled for 3 days post-op then PRN, Disp: 60 Tablet, Rfl: 0    testosterone cypionate (DEPO-TESTOSTERONE) 200 MG/ML Solution injection, testosterone cypionate 200 mg/mL intramuscular oil, Disp: , Rfl:     No Known Allergies    Objective:   Vitals: BP (!) 166/86   Pulse 74   Temp 36.4 °C (97.6 °F) (Temporal)   Resp 16   Ht 1.956 m (6' 5\")   Wt (!) 157 kg (346 lb)   SpO2 98%   BMI 41.03 kg/m²    General: Alert, pleasant, NAD  HEENT: Normocephalic.  Neck supple.   Respiratory: no distress, no audible wheezing, RR -WNL  Heart: Heart " regular no murmur.  Skin: Warm, dry, no rashes.  Extremities: No leg edema. No discoloration  Neurological: No tremors  Psych:  Affect/mood is normal, judgement is good, memory is intact, grooming is appropriate.  Assessment/Plan:      1. Essential hypertension  - Comp Metabolic Panel; Future  - MICROALBUMIN CREAT RATIO URINE; Future  - losartan (COZAAR) 50 MG Tab; Take 1 Tablet by mouth every day.  Dispense: 100 Tablet; Refill: 0    2. HALEY (dyspnea on exertion)  - REFERRAL TO CARDIOLOGY  - CBC WITHOUT DIFFERENTIAL; Future    3. Morbid obesity with BMI of 40.0-44.9, adult ([facility])    4. Coronary artery disease involving native coronary artery of native heart without angina pectoris  - REFERRAL TO CARDIOLOGY    5. Dyslipidemia  - REFERRAL TO CARDIOLOGY  - Comp Metabolic Panel; Future  - TSH; Future  - Lipid Profile; Future    6. Prediabetes  - HEMOGLOBIN A1C; Future    7. Cigarette nicotine dependence without complication    8. Snoring    9. Polycythemia    Other orders  - tamsulosin (FLOMAX) 0.4 MG capsule; Take 0.4 mg by mouth every day.       Assessment & Plan  1. Elevated blood pressure  - Consistently high blood pressure readings: 176/100, 170/97, 171/96, 175/107, 170/101, 165/100, 174/104  - Start on losartan 50 mg  - Monitor blood pressure at home and send readings after 2 weeks  - Comprehensive lab workup 1 to 2 weeks after starting losartan to assess kidney function, electrolytes, liver function, blood glucose, cholesterol, and thyroid levels  - Urine test for proteinuria  - Fast for 10 hours prior to lab work, water intake permitted  - Referral to cardiology for further evaluation    2. Headaches  - May be related to elevated blood pressure or sleep apnea  - Blood pressure management with losartan 50 mg  - Ongoing evaluation for sleep apnea    3. Shortness of breath  - Reported during physical activities such as carrying pellets and taking out the trash  - Could be related to cardiovascular issues,  deconditioning, or weight  - Referral to cardiology for further evaluation, including potential need for a stress test    4. Sleep apnea-undiagnosed-highly suspicious  -Patient reports that he is signed up to undergo an evaluation for sleep apnea.  He does not recall the name of the diagnostic center that we will be performing this evaluation.    - Symptoms include snoring and episodes of stopped breathing  - Advised to try using a CPAP machine if recommended- to reduce risk of ADR's from untreated sleep apnea.    5. Prediabetes  - Previous A1c levels: 5.8 and 5.6  - Comprehensive lab workup to reassess blood glucose levels  - Consider Ozempic if A1c levels are above 6.4 or 6.5, depending on insurance coverage    6. Weight management  - Weight stable over the past year, but symptoms suggest deconditioning  - Consider weight loss medications like Ozempic if A1c levels are elevated and insurance criteria are met    -Recently stopped smoking-continue to work on reduction of all nicotine products.    Follow-up  - Patient to follow up in 1 month    Return in about 1 month (around 3/3/2025).    {I have placed the above orders and discussed them with an approved delegating provider. The MA is performing the below orders under the direction of Dr. Matt NARAYANAN    Health maintenance:    General Recommendations:   Smoking: recommend complete avoidance of all tobacco products  Alcohol: recommend limiting consumption  Physical Activity: goal is 30 min of moderate activity 5 times a week  Weight Management and Nutrition: high vegetable, high protein diet like the Mediterranean diet, portion control, avoid excessive sugars, Low Glycemic Index foods, adequate hydration, sleep.

## 2025-02-03 NOTE — PATIENT INSTRUCTIONS
Start on Losartan 50 mg for blood pressure.   Send home BP readings after 2 weeks.   Get labs done 1-2 weeks after-fasting.   Referral to Cardiology.   Follow up after 1 month.

## 2025-02-05 DIAGNOSIS — G47.09 OTHER INSOMNIA: ICD-10-CM

## 2025-02-05 RX ORDER — TRAZODONE HYDROCHLORIDE 50 MG/1
50-100 TABLET ORAL
Qty: 60 TABLET | Refills: 0 | Status: SHIPPED | OUTPATIENT
Start: 2025-02-05 | End: 2025-02-27

## 2025-02-18 LAB
ALBUMIN SERPL-MCNC: 4.4 G/DL (ref 4.1–5.1)
ALBUMIN/CREAT UR: 279 MG/G CREAT (ref 0–29)
ALP SERPL-CCNC: 70 IU/L (ref 44–121)
ALT SERPL-CCNC: 41 IU/L (ref 0–44)
AST SERPL-CCNC: 35 IU/L (ref 0–40)
BILIRUB SERPL-MCNC: 0.6 MG/DL (ref 0–1.2)
BUN SERPL-MCNC: 14 MG/DL (ref 6–24)
BUN/CREAT SERPL: 15 (ref 9–20)
CALCIUM SERPL-MCNC: 9.2 MG/DL (ref 8.7–10.2)
CHLORIDE SERPL-SCNC: 102 MMOL/L (ref 96–106)
CHOLEST SERPL-MCNC: 178 MG/DL (ref 100–199)
CO2 SERPL-SCNC: 23 MMOL/L (ref 20–29)
CREAT SERPL-MCNC: 0.92 MG/DL (ref 0.76–1.27)
CREAT UR-MCNC: 135.1 MG/DL
EGFRCR SERPLBLD CKD-EPI 2021: 104 ML/MIN/1.73
ERYTHROCYTE [DISTWIDTH] IN BLOOD BY AUTOMATED COUNT: 12.7 % (ref 11.6–15.4)
GLOBULIN SER CALC-MCNC: 2.7 G/DL (ref 1.5–4.5)
GLUCOSE SERPL-MCNC: 97 MG/DL (ref 70–99)
HBA1C MFR BLD: 5.8 % (ref 4.8–5.6)
HCT VFR BLD AUTO: 56.6 % (ref 37.5–51)
HDLC SERPL-MCNC: 31 MG/DL
HGB BLD-MCNC: 18.5 G/DL (ref 13–17.7)
LDL CALC COMMENT:: ABNORMAL
LDLC SERPL CALC-MCNC: 128 MG/DL (ref 0–99)
MCH RBC QN AUTO: 28.8 PG (ref 26.6–33)
MCHC RBC AUTO-ENTMCNC: 32.7 G/DL (ref 31.5–35.7)
MCV RBC AUTO: 88 FL (ref 79–97)
MICROALBUMIN UR-MCNC: 377.3 UG/ML
NRBC BLD AUTO-RTO: ABNORMAL %
PLATELET # BLD AUTO: 245 X10E3/UL (ref 150–450)
POTASSIUM SERPL-SCNC: 4.5 MMOL/L (ref 3.5–5.2)
PROT SERPL-MCNC: 7.1 G/DL (ref 6–8.5)
RBC # BLD AUTO: 6.43 X10E6/UL (ref 4.14–5.8)
SODIUM SERPL-SCNC: 138 MMOL/L (ref 134–144)
TRIGL SERPL-MCNC: 102 MG/DL (ref 0–149)
TSH SERPL DL<=0.005 MIU/L-ACNC: 1.19 UIU/ML (ref 0.45–4.5)
VLDLC SERPL CALC-MCNC: 19 MG/DL (ref 5–40)
WBC # BLD AUTO: 8.6 X10E3/UL (ref 3.4–10.8)

## 2025-02-27 DIAGNOSIS — G47.09 OTHER INSOMNIA: ICD-10-CM

## 2025-02-27 RX ORDER — TRAZODONE HYDROCHLORIDE 50 MG/1
50-100 TABLET ORAL
Qty: 180 TABLET | Refills: 1 | Status: SHIPPED | OUTPATIENT
Start: 2025-02-27 | End: 2025-03-29

## 2025-02-27 NOTE — TELEPHONE ENCOUNTER
Pharmacy comment: REQUEST FOR 90 DAYS PRESCRIPTION. DX Code Needed.        To be filled at: [facility]/pharmacy #[id number] - [place], [place] - [address] AT [facility]

## 2025-03-03 ENCOUNTER — OFFICE VISIT (OUTPATIENT)
Dept: URGENT CARE | Facility: PHYSICIAN GROUP | Age: 45
End: 2025-03-03
Payer: COMMERCIAL

## 2025-03-03 VITALS
WEIGHT: 315 LBS | DIASTOLIC BLOOD PRESSURE: 72 MMHG | BODY MASS INDEX: 37.19 KG/M2 | RESPIRATION RATE: 20 BRPM | TEMPERATURE: 97.6 F | HEART RATE: 80 BPM | HEIGHT: 77 IN | SYSTOLIC BLOOD PRESSURE: 156 MMHG | OXYGEN SATURATION: 95 %

## 2025-03-03 DIAGNOSIS — R05.1 ACUTE COUGH: ICD-10-CM

## 2025-03-03 DIAGNOSIS — I10 ESSENTIAL (PRIMARY) HYPERTENSION: ICD-10-CM

## 2025-03-03 DIAGNOSIS — R68.89 FLU-LIKE SYMPTOMS: ICD-10-CM

## 2025-03-03 DIAGNOSIS — J06.9 VIRAL URI WITH COUGH: ICD-10-CM

## 2025-03-03 DIAGNOSIS — R06.2 WHEEZING: ICD-10-CM

## 2025-03-03 LAB
FLUAV RNA SPEC QL NAA+PROBE: NEGATIVE
FLUBV RNA SPEC QL NAA+PROBE: NEGATIVE
RSV RNA SPEC QL NAA+PROBE: NEGATIVE
S PYO DNA SPEC NAA+PROBE: NOT DETECTED
SARS-COV-2 RNA RESP QL NAA+PROBE: NEGATIVE

## 2025-03-03 PROCEDURE — 94640 AIRWAY INHALATION TREATMENT: CPT

## 2025-03-03 PROCEDURE — 0241U POCT CEPHEID COV-2, FLU A/B, RSV - PCR: CPT

## 2025-03-03 PROCEDURE — 99213 OFFICE O/P EST LOW 20 MIN: CPT | Mod: 25

## 2025-03-03 PROCEDURE — 87651 STREP A DNA AMP PROBE: CPT

## 2025-03-03 PROCEDURE — 3077F SYST BP >= 140 MM HG: CPT

## 2025-03-03 PROCEDURE — 3078F DIAST BP <80 MM HG: CPT

## 2025-03-03 RX ORDER — IPRATROPIUM BROMIDE AND ALBUTEROL SULFATE 2.5; .5 MG/3ML; MG/3ML
3 SOLUTION RESPIRATORY (INHALATION) ONCE
Status: COMPLETED | OUTPATIENT
Start: 2025-03-03 | End: 2025-03-03

## 2025-03-03 RX ORDER — BENZONATATE 100 MG/1
100 CAPSULE ORAL 3 TIMES DAILY PRN
Qty: 60 CAPSULE | Refills: 0 | Status: SHIPPED | OUTPATIENT
Start: 2025-03-03

## 2025-03-03 RX ORDER — PREDNISONE 20 MG/1
40 TABLET ORAL DAILY
Qty: 10 TABLET | Refills: 0 | Status: SHIPPED | OUTPATIENT
Start: 2025-03-03 | End: 2025-03-08

## 2025-03-03 RX ORDER — ALBUTEROL SULFATE 90 UG/1
2 INHALANT RESPIRATORY (INHALATION) EVERY 4 HOURS PRN
Qty: 8.5 G | Refills: 0 | Status: SHIPPED | OUTPATIENT
Start: 2025-03-03

## 2025-03-03 RX ADMIN — IPRATROPIUM BROMIDE AND ALBUTEROL SULFATE 3 ML: 2.5; .5 SOLUTION RESPIRATORY (INHALATION) at 18:06

## 2025-03-03 ASSESSMENT — ENCOUNTER SYMPTOMS
VOMITING: 0
HOARSE VOICE: 1
DIARRHEA: 0
HEADACHES: 1
COUGH: 1

## 2025-03-03 ASSESSMENT — FIBROSIS 4 INDEX: FIB4 SCORE: 1.03

## 2025-03-03 NOTE — LETTER
[facility]  [facility] [address] [address]     March 3, 2025    Patient: Slim Rossi   YOB: 1980   Date of Visit: 3/3/2025       To Whom It May Concern:    Slim Rossi was seen and treated in our department on 3/3/2025. Please excuse absence from work due to illness starting 3/3/25 through 3/5/25.    Sincerely,     ABIGAIL Wiley.

## 2025-03-04 NOTE — PROGRESS NOTES
"Subjective     Slim Faraz Rossi is a 44 y.o. male who presents with Pharyngitis, Congestion (4 days ), Otalgia, and Headache            Pharyngitis   This is a new problem. The current episode started in the past 7 days. The problem has been gradually worsening. There has been no fever. Associated symptoms include congestion, coughing, ear pain, headaches and a hoarse voice. Pertinent negatives include no diarrhea, ear discharge, shortness of breath or vomiting. Associated symptoms comments: Chills, productive cough. He has had no exposure to strep or mono. Treatments tried: OTC corididin, Flonase, cough drops. The treatment provided no relief.       Review of Systems   Constitutional:  Positive for chills. Negative for fever.   HENT:  Positive for congestion, ear pain, hoarse voice and sore throat. Negative for ear discharge and sinus pain.    Eyes:  Negative for redness.   Respiratory:  Positive for cough and sputum production. Negative for shortness of breath and wheezing.    Cardiovascular:  Negative for chest pain.   Gastrointestinal:  Negative for diarrhea, nausea and vomiting.   Skin:  Negative for rash.   Neurological:  Positive for headaches. Negative for weakness.              Objective     BP (!) 156/72   Pulse 80   Temp 36.4 °C (97.6 °F) (Temporal)   Resp 20   Ht 1.956 m (6' 5\")   Wt (!) 153 kg (337 lb)   SpO2 95%   BMI 39.96 kg/m²      Physical Exam  Constitutional:       General: He is not in acute distress.     Appearance: Normal appearance. He is normal weight. He is not ill-appearing.   HENT:      Head: Normocephalic and atraumatic.      Right Ear: Tympanic membrane, ear canal and external ear normal.      Left Ear: Tympanic membrane, ear canal and external ear normal.      Nose: Congestion and rhinorrhea present.      Mouth/Throat:      Mouth: Mucous membranes are moist.      Pharynx: Oropharynx is clear. Uvula midline. No pharyngeal swelling, oropharyngeal exudate or posterior " oropharyngeal erythema.      Tonsils: No tonsillar exudate or tonsillar abscesses. 1+ on the right. 1+ on the left.   Eyes:      Extraocular Movements: Extraocular movements intact.      Pupils: Pupils are equal, round, and reactive to light.   Cardiovascular:      Rate and Rhythm: Normal rate and regular rhythm.   Pulmonary:      Effort: Pulmonary effort is normal. No respiratory distress.      Breath sounds: No stridor. Examination of the left-upper field reveals wheezing. Wheezing present. No rhonchi.   Musculoskeletal:      Cervical back: Normal range of motion and neck supple. No rigidity.   Lymphadenopathy:      Cervical: No cervical adenopathy.   Skin:     General: Skin is warm and dry.   Neurological:      General: No focal deficit present.      Mental Status: He is alert and oriented to person, place, and time. Mental status is at baseline.                Assessment & Plan  Viral URI with cough  This is an acute condition.  Previous visits, pmhx, medication, and VS reviewed.  Patient not acutely ill appearing, or in acute distress.  VSS.    Lung sounds with wheezing to left upper lobe unrelieved by cough.  Mild nonproductive cough throughout exam.  Bilateral ear Tms without erythema or bulging.  No sinus tenderness to palpation, mild pharyngeal erythema without tonsillar edema or exudate.  Symptoms likely viral in etiology.  Though symptom onset within one week, would appreciate xray to r/o consolidation or hyperinflation given mild lung wheezing and long smoking history, but patient is politely declining at this time.  Viral panel and Strep POCT negative.  Patient reports some relief of coughing after duoneb in clinic.  Albuterol inhaler prn  Offered patient short course of steroids, but he is refusing at this time.  It was explained today that due to the viral nature of the patient's illness, we will treat symptomatically today. Discussed that symptoms do not warrant antibiotics.    Benzonatate TID prn  for suppressant, thought I encouraged patient to continue decongestant.   Encouraged to continue OTC supportive meds PRN. Humidification, increased fluids intake, adding electrolytes to diet, gargling salt water, drinking warm liquids with honey, OTC throat spray, and rest also discussed.   Discussed side effects and interactions of OTC meds and any prescribed.  Patient instructed to return to clinic if symptoms do not improve or worsen within 5 days.  Also informed that if chest pain or increased shortness of breath develop to immediately follow up for evaluation in the ED. Advised of risks of not doing so.  Flu-like symptoms    Orders:    POCT Cepheid Group A Strep - PCR    POCT Cepheid CoV-2, Flu A/B, RSV - PCR    Results for orders placed or performed in visit on 03/03/25   POCT Cepheid CoV-2, Flu A/B, RSV - PCR    Collection Time: 03/03/25  5:38 PM   Result Value Ref Range    SARS-CoV-2 by PCR Negative Negative, Invalid    Influenza virus A RNA Negative Negative, Invalid    Influenza virus B, PCR Negative Negative, Invalid    RSV, PCR Negative Negative, Invalid   POCT Cepheid Group A Strep - PCR    Collection Time: 03/03/25  5:40 PM   Result Value Ref Range    POC Group A Strep, PCR Not Detected Not Detected, Invalid      Wheezing    Orders:    ipratropium-albuterol (DUONEB) nebulizer solution    albuterol (PROAIR HFA) 108 (90 Base) MCG/ACT Aero Soln inhalation aerosol; Inhale 2 Puffs every four hours as needed for Shortness of Breath.    Acute cough    Orders:    benzonatate (TESSALON) 100 MG Cap; Take 1 Capsule by mouth 3 times a day as needed for Cough.    Essential (primary) hypertension    This is a chronic condition.  Patient reports compliance with daily Losartan, and that blood pressure at today's visit is elevated for him, but that he reports could be related to acute symptoms.  Patient is asymptomatic at CP, SOB, or neuro changes.  Encouraged patient to f/u with PCP for closer monitoring and  evaluation.    Strict ER precautions for any neuro, pulmonary, and cardiac changes.      The patient demonstrated a good understanding and agreed with the treatment plan. Denies further questions.

## 2025-03-05 ASSESSMENT — ENCOUNTER SYMPTOMS
SPUTUM PRODUCTION: 1
WEAKNESS: 0
NAUSEA: 0
CHILLS: 1
SHORTNESS OF BREATH: 0
EYE REDNESS: 0
WHEEZING: 0
SINUS PAIN: 0
FEVER: 0
SORE THROAT: 1

## 2025-03-21 ENCOUNTER — PATIENT MESSAGE (OUTPATIENT)
Dept: MEDICAL GROUP | Facility: MEDICAL CENTER | Age: 45
End: 2025-03-21
Payer: COMMERCIAL

## 2025-03-21 DIAGNOSIS — I10 ESSENTIAL HYPERTENSION: ICD-10-CM

## 2025-03-26 RX ORDER — LOSARTAN POTASSIUM 100 MG/1
100 TABLET ORAL DAILY
Qty: 100 TABLET | Refills: 2 | Status: SHIPPED | OUTPATIENT
Start: 2025-03-26 | End: 2026-04-30

## 2025-04-04 ASSESSMENT — ENCOUNTER SYMPTOMS
DIARRHEA: 0
VOMITING: 0
IRREGULAR HEARTBEAT: 0
SYNCOPE: 0
FEVER: 0
FOCAL WEAKNESS: 0
DIZZINESS: 0
WEAKNESS: 0
COUGH: 0
ABDOMINAL PAIN: 0
SHORTNESS OF BREATH: 0
PND: 0
WHEEZING: 0
NEAR-SYNCOPE: 0
NAUSEA: 0
PALPITATIONS: 0
DYSPNEA ON EXERTION: 0
ORTHOPNEA: 0
NIGHT SWEATS: 0

## 2025-04-04 NOTE — PROGRESS NOTES
Cardiology Initial Consultation Note    Date of note:    04/07/25    Primary Care Provider: ORACIO Zapata  Referring Provider: Dilcia Melgar*     Patient Name: Slim Rossi   YOB: 1980  MRN:              [id number]    Chief Complaint: Dyspnea on exertion    History of Present Illness: Mr. Slim Rossi is a 45 y.o. male whose current medical problems include dyslipidemia, tobacco abuse, history of distant meth use, GERD, and prediabetes who is here for cardiac consultation for dyspnea on exertion.    The patient presents today to discuss symptoms. The patient reports that he started having more shortness of breath when he started vaping (quit smoking and vaping to transition) about 4 months ago - he is working on [employer] vaping. The patient denies any orthopnea or PND. Did have history of leg swelling, but none currently. No palpitations. No syncope or presyncopal episodes.     Cardiovascular Risk Factors:  1. Smoking status: Former smoker  2. Type II Diabetes Mellitus: Prediabetes, on medication   Lab Results   Component Value Date/Time    HBA1C 5.8 (H) 02/15/2025 06:51 AM    HBA1C 5.6 03/02/2024 07:20 AM    HBA1C 5.8 (H) 06/19/2023 08:47 AM     3. Hypertension: On medication  4. Dyslipidemia: Diet controlled   Cholesterol,Tot   Date Value Ref Range Status   02/15/2025 178 100 - 199 mg/dL Final   03/02/2024 184 100 - 199 mg/dL Final     LDL   Date Value Ref Range Status   03/02/2024 129 (H) <100 mg/dL Final     HDL   Date Value Ref Range Status   02/15/2025 31 (L) >39 mg/dL Final   03/02/2024 29 (A) >=40 mg/dL Final     Triglycerides   Date Value Ref Range Status   02/15/2025 102 0 - 149 mg/dL Final   03/02/2024 131 0 - 149 mg/dL Final     5. Family history of early Coronary Artery Disease in a first degree relative (Male less than 55 years of age; Female less than 65 years of age): Maternal grandmother with MI  6.  Obesity and/or Metabolic Syndrome: Body mass  index is 40.91 kg/m².  7. Sedentary lifestyle: Not active    Review of Systems   Constitutional: Negative for fever, malaise/fatigue and night sweats.   Cardiovascular:  Negative for chest pain, dyspnea on exertion, irregular heartbeat, leg swelling, near-syncope, orthopnea, palpitations, paroxysmal nocturnal dyspnea and syncope.   Respiratory:  Negative for cough, shortness of breath and wheezing.    Gastrointestinal:  Negative for abdominal pain, diarrhea, nausea and vomiting.   Neurological:  Negative for dizziness, focal weakness and weakness.         All other systems reviewed and are negative.         Current Outpatient Medications   Medication Sig Dispense Refill    traZODone (DESYREL) 50 MG Tab TAKE 1-2 TABLETS BY MOUTH AT BEDTIME AS NEEDED FOR SLEEP FOR UP TO 30 DAYS.      losartan (COZAAR) 100 MG Tab Take 1 Tablet by mouth every day. 100 Tablet 2    albuterol (PROAIR HFA) 108 (90 Base) MCG/ACT Aero Soln inhalation aerosol Inhale 2 Puffs every four hours as needed for Shortness of Breath. 8.5 g 0    benzonatate (TESSALON) 100 MG Cap Take 1 Capsule by mouth 3 times a day as needed for Cough. 60 Capsule 0    tamsulosin (FLOMAX) 0.4 MG capsule Take 0.4 mg by mouth every day.      VITAMIN D-VITAMIN K PO Take  by mouth.      acetaminophen (TYLENOL) 325 MG Tab Take 2 Tablets by mouth every 6 hours. Alternate w/ ibuprofen to take a medication every 3 hrs, take scheduled for 3 days post-op then PRN 60 Tablet 0    testosterone cypionate (DEPO-TESTOSTERONE) 200 MG/ML Solution injection testosterone cypionate 200 mg/mL intramuscular oil      Semaglutide,0.25 or 0.5MG/DOS, (OZEMPIC, 0.25 OR 0.5 MG/DOSE,) 2 MG/3ML Solution Pen-injector Inject 0.25 mg under the skin every 7 days. (Patient not taking: Reported on 4/7/2025) 3 mL 3     No current facility-administered medications for this visit.         No Known Allergies      Physical Exam:  Ambulatory Vitals  BP (!) 130/90 (BP Location: Left arm, Patient Position:  "Sitting, BP Cuff Size: Adult)   Pulse 69   Resp 18   Ht 1.956 m (6' 5\")   Wt (!) 156 kg (345 lb)   SpO2 95%    Oxygen Therapy:  Pulse Oximetry: 95 %  BP Readings from Last 4 Encounters:   04/07/25 (!) 130/90   03/03/25 (!) 156/72   02/03/25 (!) 166/86   02/26/24 (!) 142/78       Weight/BMI: Body mass index is 40.91 kg/m².  Wt Readings from Last 4 Encounters:   04/07/25 (!) 156 kg (345 lb)   03/03/25 (!) 153 kg (337 lb)   02/03/25 (!) 157 kg (346 lb)   04/22/24 (!) 158 kg (349 lb 6.4 oz)         General: Well appearing and in no apparent distress  Eyes: nl conjunctiva, no icteric sclera  ENT: normal external appearance of ears, nose, and throat  Neck: no visible JVP,  no carotid bruits  Lungs: normal respiratory effort, CTAB  Heart: RRR, no murmurs, no rubs or gallops,  no edema bilateral lower extremities. No LV/RV heave on cardiac palpatation. + bilateral radial pulses.  + bilateral dp pulses.   Abdomen: soft, non tender, non distended, no masses, normal bowel sounds.  No HSM.  Extremities/MSK: no clubbing, no cyanosis  Neurological: No focal sensory deficits  Psychiatric: Appropriate affect, A/O x 3, intact judgement and insight  Skin: Warm extremities      Lab Data Review:  Lab Results   Component Value Date/Time    CHOLSTRLTOT 178 02/15/2025 06:51 AM    CHOLSTRLTOT 184 03/02/2024 07:20 AM     (H) 03/02/2024 07:20 AM    HDL 31 (L) 02/15/2025 06:51 AM    HDL 29 (A) 03/02/2024 07:20 AM    TRIGLYCERIDE 102 02/15/2025 06:51 AM    TRIGLYCERIDE 131 03/02/2024 07:20 AM       Lab Results   Component Value Date/Time    SODIUM 138 02/15/2025 06:51 AM    SODIUM 140 03/02/2024 07:20 AM    POTASSIUM 4.5 02/15/2025 06:51 AM    POTASSIUM 4.2 03/02/2024 07:20 AM    CHLORIDE 102 02/15/2025 06:51 AM    CHLORIDE 105 03/02/2024 07:20 AM    CO2 23 02/15/2025 06:51 AM    CO2 24 03/02/2024 07:20 AM    GLUCOSE 97 02/15/2025 06:51 AM    GLUCOSE 105 (H) 03/02/2024 07:20 AM    BUN 14 02/15/2025 06:51 AM    BUN 15 03/02/2024 " 07:20 AM    CREATININE 0.92 02/15/2025 06:51 AM    CREATININE 0.82 03/02/2024 07:20 AM    CREATININE 1.0 04/01/2007 11:10 AM    BUNCREATRAT 15 02/15/2025 06:51 AM     Lab Results   Component Value Date/Time    ALKPHOSPHAT 70 02/15/2025 06:51 AM    ALKPHOSPHAT 67 03/02/2024 07:20 AM    ASTSGOT 35 02/15/2025 06:51 AM    ASTSGOT 31 03/02/2024 07:20 AM    ALTSGPT 41 02/15/2025 06:51 AM    ALTSGPT 37 03/02/2024 07:20 AM    TBILIRUBIN 0.6 02/15/2025 06:51 AM    TBILIRUBIN 0.6 03/02/2024 07:20 AM      Lab Results   Component Value Date/Time    WBC 8.6 02/15/2025 06:51 AM    WBC 10.8 01/20/2024 10:38 AM    HEMOGLOBIN 18.5 (H) 02/15/2025 06:51 AM    HEMOGLOBIN 18.1 (H) 01/20/2024 10:38 AM     Lab Results   Component Value Date/Time    HBA1C 5.8 (H) 02/15/2025 06:51 AM    HBA1C 5.6 03/02/2024 07:20 AM    HBA1C 5.8 (H) 06/19/2023 08:47 AM         Cardiac Imaging and Procedures Review:    EKG dated 4/7/2025: My personal interpretation is sinus rhythm, ST elevation, probable normal early repolarization pattern    No prior echocardiogram on file      Assessment & Plan     1. Dyspnea on exertion  EKG    EC-ECHOCARDIOGRAM COMPLETE W/O CONT    NM-CARDIAC TREADMILL ONLY-NO IMAGING      2. History of methamphetamine use  EC-ECHOCARDIOGRAM COMPLETE W/O CONT    NM-CARDIAC TREADMILL ONLY-NO IMAGING      3. Essential hypertension  EC-ECHOCARDIOGRAM COMPLETE W/O CONT    NM-CARDIAC TREADMILL ONLY-NO IMAGING      4. Dyslipidemia  Lipid Profile            Shared Medical Decision Making:    Dyspnea on exertion  History of meth use (distant, clean since 2014)  -Will plan for treadmill EKG to assess for any ischemia and BP response to exercise  -Will plan for echocardiogram to assess LVEF and any structural abnormalities given symptoms with known history of meth use (although distant and clean)    Dyslipidemia  The 10-year ASCVD risk score (Sneha CASTRO, et al., 2019) is: 4.4%  -Declined statin at this time, maybe interested in ezetimibe  -Continue  heart healthy diet and exercise  -Repeat lipid panel prior to next visit    Hypertension  BP mildly elevated  -Continue losartan  -if BP remains elevated next visit or hypertensive BP reponse to exericse, will adjust anithypertensives next visit.     All of the patient's excellent questions were answered to the best of my knowledge and to his satisfaction.  It was a pleasure seeing Mr. Slim Rossi in my clinic today. Return in about 3 months (around 7/7/2025). Patient is aware to call the cardiology clinic with any questions or concerns.      Jc Alexandre MD  [facility]  [facility], [address]  [address] [address]  Phone: [phone]  Fax: [phone]

## 2025-04-07 ENCOUNTER — OFFICE VISIT (OUTPATIENT)
Dept: CARDIOLOGY | Facility: MEDICAL CENTER | Age: 45
End: 2025-04-07
Attending: NURSE PRACTITIONER
Payer: COMMERCIAL

## 2025-04-07 VITALS
DIASTOLIC BLOOD PRESSURE: 90 MMHG | WEIGHT: 315 LBS | RESPIRATION RATE: 18 BRPM | SYSTOLIC BLOOD PRESSURE: 130 MMHG | OXYGEN SATURATION: 95 % | BODY MASS INDEX: 37.19 KG/M2 | HEART RATE: 69 BPM | HEIGHT: 77 IN

## 2025-04-07 DIAGNOSIS — F15.91 HISTORY OF METHAMPHETAMINE USE: ICD-10-CM

## 2025-04-07 DIAGNOSIS — E78.5 DYSLIPIDEMIA: ICD-10-CM

## 2025-04-07 DIAGNOSIS — I10 ESSENTIAL HYPERTENSION: ICD-10-CM

## 2025-04-07 DIAGNOSIS — R06.09 DYSPNEA ON EXERTION: ICD-10-CM

## 2025-04-07 LAB — EKG IMPRESSION: NORMAL

## 2025-04-07 PROCEDURE — 93010 ELECTROCARDIOGRAM REPORT: CPT | Performed by: STUDENT IN AN ORGANIZED HEALTH CARE EDUCATION/TRAINING PROGRAM

## 2025-04-07 PROCEDURE — 99204 OFFICE O/P NEW MOD 45 MIN: CPT | Performed by: STUDENT IN AN ORGANIZED HEALTH CARE EDUCATION/TRAINING PROGRAM

## 2025-04-07 PROCEDURE — 99212 OFFICE O/P EST SF 10 MIN: CPT | Performed by: STUDENT IN AN ORGANIZED HEALTH CARE EDUCATION/TRAINING PROGRAM

## 2025-04-07 PROCEDURE — 93005 ELECTROCARDIOGRAM TRACING: CPT | Mod: TC | Performed by: STUDENT IN AN ORGANIZED HEALTH CARE EDUCATION/TRAINING PROGRAM

## 2025-04-07 RX ORDER — TRAZODONE HYDROCHLORIDE 50 MG/1
TABLET ORAL
COMMUNITY
Start: 2025-02-28

## 2025-04-07 ASSESSMENT — FIBROSIS 4 INDEX: FIB4 SCORE: 1.05

## 2025-04-24 ENCOUNTER — HOSPITAL ENCOUNTER (OUTPATIENT)
Dept: RADIOLOGY | Facility: MEDICAL CENTER | Age: 45
End: 2025-04-24
Attending: STUDENT IN AN ORGANIZED HEALTH CARE EDUCATION/TRAINING PROGRAM
Payer: COMMERCIAL

## 2025-04-24 DIAGNOSIS — R06.09 DYSPNEA ON EXERTION: ICD-10-CM

## 2025-04-24 DIAGNOSIS — I10 ESSENTIAL HYPERTENSION: ICD-10-CM

## 2025-04-24 DIAGNOSIS — F15.91 HISTORY OF METHAMPHETAMINE USE: ICD-10-CM

## 2025-04-24 PROCEDURE — 93017 CV STRESS TEST TRACING ONLY: CPT | Mod: TC

## 2025-04-25 ENCOUNTER — RESULTS FOLLOW-UP (OUTPATIENT)
Dept: CARDIOLOGY | Facility: MEDICAL CENTER | Age: 45
End: 2025-04-25

## 2025-04-25 DIAGNOSIS — I10 ESSENTIAL HYPERTENSION: ICD-10-CM

## 2025-04-28 RX ORDER — AMLODIPINE BESYLATE 5 MG/1
5 TABLET ORAL DAILY
Qty: 90 TABLET | Refills: 1 | Status: SHIPPED | OUTPATIENT
Start: 2025-04-28 | End: 2026-06-02

## 2025-04-28 NOTE — TELEPHONE ENCOUNTER
----- Message from Physician Jc Alexandre M.D. sent at 4/25/2025  5:09 PM PDT -----  Negative treadmill EKG for ischemia.  But the patient did have hypertensive response to exercise.  Recommend adding amlodipine 5 mg daily.  -------------------------------------------------------------------------------    RX for amlodipine 5mg daily ordered. [employer]vert message sent to pt.

## 2025-07-22 ENCOUNTER — HOSPITAL ENCOUNTER (OUTPATIENT)
Dept: LAB | Facility: MEDICAL CENTER | Age: 45
End: 2025-07-22
Attending: PHYSICIAN ASSISTANT
Payer: COMMERCIAL

## 2025-07-22 LAB
BASOPHILS # BLD AUTO: 0.9 % (ref 0–1.8)
BASOPHILS # BLD: 0.09 K/UL (ref 0–0.12)
EOSINOPHIL # BLD AUTO: 0.43 K/UL (ref 0–0.51)
EOSINOPHIL NFR BLD: 4.5 % (ref 0–6.9)
ERYTHROCYTE [DISTWIDTH] IN BLOOD BY AUTOMATED COUNT: 38.7 FL (ref 35.9–50)
ESTRADIOL SERPL-MCNC: 56 PG/ML
HCT VFR BLD AUTO: 51.8 % (ref 42–52)
HGB BLD-MCNC: 16.9 G/DL (ref 14–18)
IMM GRANULOCYTES # BLD AUTO: 0.04 K/UL (ref 0–0.11)
IMM GRANULOCYTES NFR BLD AUTO: 0.4 % (ref 0–0.9)
LYMPHOCYTES # BLD AUTO: 1.98 K/UL (ref 1–4.8)
LYMPHOCYTES NFR BLD: 20.5 % (ref 22–41)
MCH RBC QN AUTO: 28 PG (ref 27–33)
MCHC RBC AUTO-ENTMCNC: 32.6 G/DL (ref 32.3–36.5)
MCV RBC AUTO: 85.9 FL (ref 81.4–97.8)
MONOCYTES # BLD AUTO: 0.94 K/UL (ref 0–0.85)
MONOCYTES NFR BLD AUTO: 9.7 % (ref 0–13.4)
NEUTROPHILS # BLD AUTO: 6.17 K/UL (ref 1.82–7.42)
NEUTROPHILS NFR BLD: 64 % (ref 44–72)
NRBC # BLD AUTO: 0 K/UL
NRBC BLD-RTO: 0 /100 WBC (ref 0–0.2)
PLATELET # BLD AUTO: 254 K/UL (ref 164–446)
PMV BLD AUTO: 8.6 FL (ref 9–12.9)
RBC # BLD AUTO: 6.03 M/UL (ref 4.7–6.1)
TESTOST SERPL-MCNC: 1110 NG/DL (ref 175–781)
WBC # BLD AUTO: 9.7 K/UL (ref 4.8–10.8)

## 2025-07-22 PROCEDURE — 82670 ASSAY OF TOTAL ESTRADIOL: CPT

## 2025-07-22 PROCEDURE — 36415 COLL VENOUS BLD VENIPUNCTURE: CPT

## 2025-07-22 PROCEDURE — 84403 ASSAY OF TOTAL TESTOSTERONE: CPT

## 2025-07-22 PROCEDURE — 85025 COMPLETE CBC W/AUTO DIFF WBC: CPT

## 2025-08-05 ENCOUNTER — HOSPITAL ENCOUNTER (OUTPATIENT)
Dept: CARDIOLOGY | Facility: MEDICAL CENTER | Age: 45
End: 2025-08-05
Attending: STUDENT IN AN ORGANIZED HEALTH CARE EDUCATION/TRAINING PROGRAM
Payer: COMMERCIAL

## 2025-08-05 DIAGNOSIS — F15.91 HISTORY OF METHAMPHETAMINE USE: ICD-10-CM

## 2025-08-05 DIAGNOSIS — R06.09 DYSPNEA ON EXERTION: ICD-10-CM

## 2025-08-05 DIAGNOSIS — I10 ESSENTIAL HYPERTENSION: ICD-10-CM

## 2025-08-05 PROCEDURE — 93306 TTE W/DOPPLER COMPLETE: CPT

## 2025-08-06 LAB
LV EJECT FRACT MOD 2C 99903: 60.23
LV EJECT FRACT MOD 4C 99902: 60.36
LV EJECT FRACT MOD BP 99901: 60.25

## 2025-08-06 PROCEDURE — 93306 TTE W/DOPPLER COMPLETE: CPT | Mod: 26 | Performed by: STUDENT IN AN ORGANIZED HEALTH CARE EDUCATION/TRAINING PROGRAM

## 2025-08-29 RX ORDER — TRAZODONE HYDROCHLORIDE 50 MG/1
TABLET ORAL
Qty: 180 TABLET | Refills: 1 | Status: SHIPPED | OUTPATIENT
Start: 2025-08-29

## (undated) DEVICE — SCISSORS 5MM CVD (6EA/BX)

## (undated) DEVICE — CANISTER SUCTION 3000ML MECHANICAL FILTER AUTO SHUTOFF MEDI-VAC NONSTERILE LF DISP  (40EA/CA)

## (undated) DEVICE — NEPTUNE 4 PORT MANIFOLD - (20/PK)

## (undated) DEVICE — BLADE SURGICAL #15 - (50/BX 3BX/CA)

## (undated) DEVICE — GLOVE BIOGEL PI INDICATOR SZ 7.0 SURGICAL PF LF - (50/BX 4BX/CA)

## (undated) DEVICE — GOWN WARMING STANDARD FLEX - (30/CA)

## (undated) DEVICE — LACTATED RINGERS INJ 1000 ML - (14EA/CA 60CA/PF)

## (undated) DEVICE — ELECTRODE 5MM LHK LAPSCP STERILE DISP- MEGADYNE  (5/CA)

## (undated) DEVICE — APPLIER 5MM MED/LARGE CLIP - (3/BX)

## (undated) DEVICE — TOWEL STOP TIMEOUT SAFETY FLAG (40EA/CA)

## (undated) DEVICE — GLOVE BIOGEL INDICATOR SZ 8 SURGICAL PF LTX - (50/BX 4BX/CA)

## (undated) DEVICE — SYSTEM CLEARIFY VISUALIZATION (10EA/PK)

## (undated) DEVICE — CANNULA W/SEAL 5X100 Z-THRE - ADED KII (12/BX)

## (undated) DEVICE — SET EXTENSION WITH 2 PORTS (48EA/CA) ***PART #2C8610 IS A SUBSTITUTE*****

## (undated) DEVICE — TUBE E-T HI-LO CUFF 8.0MM (10EA/PK)

## (undated) DEVICE — SUCTION INSTRUMENT YANKAUER BULBOUS TIP W/O VENT (50EA/CA)

## (undated) DEVICE — TUBING CLEARLINK DUO-VENT - C-FLO (48EA/CA)

## (undated) DEVICE — HEAD HOLDER JUNIOR/ADULT

## (undated) DEVICE — SUTURE 0 VICRYL PLUS UR-6 - 27 INCH (36/BX)

## (undated) DEVICE — CATHETER CHOLANGIOGRAM TAUT - (10/BX)

## (undated) DEVICE — DERMABOND ADVANCED - (12EA/BX)

## (undated) DEVICE — TROCAR LAPSCP 100MM 12MM NTHRD - (6/BX)

## (undated) DEVICE — MASK ANESTHESIA ADULT  - (100/CA)

## (undated) DEVICE — KIT ANESTHESIA W/CIRCUIT & 3/LT BAG W/FILTER (20EA/CA)

## (undated) DEVICE — Device

## (undated) DEVICE — MAT PATIENT POSITIONING PREVALON (10EA/CA)

## (undated) DEVICE — SODIUM CHL IRRIGATION 0.9% 1000ML (12EA/CA)

## (undated) DEVICE — SUTURE GENERAL

## (undated) DEVICE — SUTURE 4-0 MONOCRYL PLUS PS-2 - 27 INCH (36/BX)

## (undated) DEVICE — PROTECTOR ULNA NERVE - (36PR/CA)

## (undated) DEVICE — TROCAR 5X100 BLADED Z-THREAD - KII (6/BX)

## (undated) DEVICE — SENSOR SPO2 NEO LNCS ADHESIVE (20/BX) SEE USER NOTES

## (undated) DEVICE — SET LEADWIRE 5 LEAD BEDSIDE DISPOSABLE ECG (1SET OF 5/EA)

## (undated) DEVICE — GLOVE BIOGEL SZ 8 SURGICAL PF LTX - (50PR/BX 4BX/CA)

## (undated) DEVICE — CANISTER SUCTION RIGID RED 1500CC (40EA/CA)

## (undated) DEVICE — TROCAR 5X100 NON BLADED Z-TH - READ KII (6/BX)

## (undated) DEVICE — ELECTRODE DUAL RETURN W/ CORD - (50/PK)

## (undated) DEVICE — CHLORAPREP 26 ML APPLICATOR - ORANGE TINT(25/CA)

## (undated) DEVICE — SLEEVE, VASO, THIGH, MED

## (undated) DEVICE — ELECTRODE 850 FOAM ADHESIVE - HYDROGEL RADIOTRNSPRNT (50/PK)